# Patient Record
Sex: FEMALE | Race: WHITE | Employment: OTHER | URBAN - METROPOLITAN AREA
[De-identification: names, ages, dates, MRNs, and addresses within clinical notes are randomized per-mention and may not be internally consistent; named-entity substitution may affect disease eponyms.]

---

## 2018-01-19 RX ORDER — ACETAMINOPHEN 500 MG
500 TABLET ORAL EVERY 4 HOURS PRN
Status: ON HOLD | COMMUNITY
End: 2019-09-08 | Stop reason: HOSPADM

## 2018-01-19 RX ORDER — GABAPENTIN 100 MG/1
100 CAPSULE ORAL 2 TIMES DAILY
Status: ON HOLD | COMMUNITY
End: 2019-09-05

## 2018-01-19 RX ORDER — LATANOPROST 50 UG/ML
1 SOLUTION/ DROPS OPHTHALMIC DAILY
Status: ON HOLD | COMMUNITY
End: 2021-09-09 | Stop reason: HOSPADM

## 2018-01-19 RX ORDER — DORZOLAMIDE HCL 20 MG/ML
1 SOLUTION/ DROPS OPHTHALMIC 2 TIMES DAILY
Status: ON HOLD | COMMUNITY
End: 2021-09-06

## 2018-01-19 RX ORDER — ATORVASTATIN CALCIUM 10 MG/1
10 TABLET, FILM COATED ORAL DAILY
COMMUNITY

## 2018-01-19 RX ORDER — CHOLECALCIFEROL (VITAMIN D3) 125 MCG
500 CAPSULE ORAL DAILY
Status: ON HOLD | COMMUNITY
End: 2019-09-05

## 2018-01-19 RX ORDER — HYDRALAZINE HYDROCHLORIDE 25 MG/1
25 TABLET, FILM COATED ORAL 2 TIMES DAILY
Status: ON HOLD | COMMUNITY
End: 2019-09-05

## 2018-01-19 RX ORDER — LABETALOL 100 MG/1
200 TABLET, FILM COATED ORAL 2 TIMES DAILY
COMMUNITY

## 2018-01-19 RX ORDER — CYCLOBENZAPRINE HCL 5 MG
5 TABLET ORAL EVERY 8 HOURS
Status: ON HOLD | COMMUNITY
End: 2019-09-05

## 2018-01-19 RX ORDER — TRAMADOL HYDROCHLORIDE 50 MG/1
50 TABLET ORAL EVERY 6 HOURS PRN
Status: ON HOLD | COMMUNITY
End: 2019-09-05

## 2018-01-19 RX ORDER — AMOXICILLIN 250 MG
1 CAPSULE ORAL DAILY
Status: ON HOLD | COMMUNITY
End: 2019-09-05

## 2018-01-19 RX ORDER — ASPIRIN 325 MG
325 TABLET ORAL DAILY
Status: ON HOLD | COMMUNITY
End: 2019-09-08 | Stop reason: HOSPADM

## 2018-01-19 RX ORDER — OMEGA-3S/DHA/EPA/FISH OIL/D3 300MG-1000
400 CAPSULE ORAL DAILY
Status: ON HOLD | COMMUNITY
End: 2019-09-05

## 2018-01-19 RX ORDER — LEVOTHYROXINE SODIUM 0.1 MG/1
100 TABLET ORAL DAILY
COMMUNITY

## 2019-03-19 ENCOUNTER — OFFICE VISIT (OUTPATIENT)
Dept: GERIATRIC MEDICINE | Age: 84
End: 2019-03-19
Payer: MEDICARE

## 2019-03-19 DIAGNOSIS — E03.9 HYPOTHYROIDISM, UNSPECIFIED TYPE: ICD-10-CM

## 2019-03-19 DIAGNOSIS — M15.9 OSTEOARTHRITIS OF MULTIPLE JOINTS, UNSPECIFIED OSTEOARTHRITIS TYPE: ICD-10-CM

## 2019-03-19 DIAGNOSIS — I10 ESSENTIAL HYPERTENSION: Primary | ICD-10-CM

## 2019-03-19 DIAGNOSIS — H40.40X0 GLAUCOMA SECONDARY TO EYE INFLAMMATION, UNSPECIFIED GLAUCOMA STAGE, UNSPECIFIED LATERALITY: ICD-10-CM

## 2019-03-19 DIAGNOSIS — R63.4 WEIGHT LOSS: ICD-10-CM

## 2019-03-19 PROCEDURE — 1090F PRES/ABSN URINE INCON ASSESS: CPT | Performed by: INTERNAL MEDICINE

## 2019-03-19 PROCEDURE — 4040F PNEUMOC VAC/ADMIN/RCVD: CPT | Performed by: INTERNAL MEDICINE

## 2019-03-19 PROCEDURE — G8421 BMI NOT CALCULATED: HCPCS | Performed by: INTERNAL MEDICINE

## 2019-03-19 PROCEDURE — 99215 OFFICE O/P EST HI 40 MIN: CPT | Performed by: INTERNAL MEDICINE

## 2019-03-19 PROCEDURE — G8484 FLU IMMUNIZE NO ADMIN: HCPCS | Performed by: INTERNAL MEDICINE

## 2019-03-19 PROCEDURE — 4004F PT TOBACCO SCREEN RCVD TLK: CPT | Performed by: INTERNAL MEDICINE

## 2019-03-19 PROCEDURE — G8428 CUR MEDS NOT DOCUMENT: HCPCS | Performed by: INTERNAL MEDICINE

## 2019-03-19 PROCEDURE — 1123F ACP DISCUSS/DSCN MKR DOCD: CPT | Performed by: INTERNAL MEDICINE

## 2019-04-05 NOTE — PROGRESS NOTES
PATIENT: Daniela Scott : 1928 DOS: 2019     Juan Miguel Dave    Seen for a followup visit for this 24-year-old woman, who was seen in the office with her  present. The patient was recently seen with the . The patient has had a recent evaluation of medication. The patient has discontinued her eye drops. She does have followup pending with ophthalmology. The patient has not had any new chest pain or palpitations. She has had recent weight loss, poor oral intake. The patient has not had any new chest pain or palpitations. She remains globally at her baseline. The patient has not had any new emesis. Her mentation is close to baseline. Her last MoCA was in September, it was . OBJECTIVE:  Her vital signs were stable. She was afebrile. Pupils are equal and reactive. Extraocular movements are intact. Oral mucosa moist. No thrush. Chest showed diminished breath sounds. No crackles, no wheezing. Cardiovascular exam showed regular rate. No appreciable murmurs, rubs or gallops. ABDOMEN:  Soft, nontender. Extremities showed +1 dorsal pedal pulse. ASSESSMENT AND PLAN:  1. Hypertension. Blood pressure is stable. Continue with home regimen. 2.   Hypothyroidism. The patient is on Synthroid. Last TSH was at target in December. We will maintain the current regimen. 3.   degenerative joint disease. Continue on anti-inflammatories. 4.   Weight loss. We will start on low-dose Remeron and monitor clinically. 5.   Glaucoma. The patient is following up with ophthalmology. I did instruct her to discuss with ophthalmologist for discontinuation of medications. We will follow up as needed.         Electronically Signed By: Adarsh Wilkins M.D. on 2019 22:35:15  ______________________________  Adarsh Wilkins M.D.  [id number]  D: 2019 15:59:29  T: 2019 14:38:32    cc: Prudencio Quiñones at [facility]

## 2019-09-05 ENCOUNTER — APPOINTMENT (OUTPATIENT)
Dept: GENERAL RADIOLOGY | Age: 84
DRG: 480 | End: 2019-09-05
Payer: MEDICARE

## 2019-09-05 ENCOUNTER — HOSPITAL ENCOUNTER (INPATIENT)
Age: 84
LOS: 3 days | Discharge: NURSING, REHAB OR LONG-TERM CARE | DRG: 480 | End: 2019-09-08
Attending: EMERGENCY MEDICINE | Admitting: ORTHOPAEDIC SURGERY
Payer: MEDICARE

## 2019-09-05 ENCOUNTER — ANESTHESIA (OUTPATIENT)
Dept: OPERATING ROOM | Age: 84
DRG: 480 | End: 2019-09-05
Payer: MEDICARE

## 2019-09-05 ENCOUNTER — ANESTHESIA EVENT (OUTPATIENT)
Dept: OPERATING ROOM | Age: 84
DRG: 480 | End: 2019-09-05
Payer: MEDICARE

## 2019-09-05 VITALS — SYSTOLIC BLOOD PRESSURE: 85 MMHG | DIASTOLIC BLOOD PRESSURE: 42 MMHG | OXYGEN SATURATION: 100 % | TEMPERATURE: 93.2 F

## 2019-09-05 DIAGNOSIS — S72.001A HIP FRACTURE REQUIRING OPERATIVE REPAIR, RIGHT, CLOSED, INITIAL ENCOUNTER (HCC): ICD-10-CM

## 2019-09-05 DIAGNOSIS — S72.001A CLOSED FRACTURE OF RIGHT HIP REQUIRING OPERATIVE REPAIR, INITIAL ENCOUNTER (HCC): Primary | ICD-10-CM

## 2019-09-05 LAB
ALBUMIN SERPL-MCNC: 3.4 G/DL (ref 3.5–4.6)
ALP BLD-CCNC: 59 U/L (ref 40–130)
ALT SERPL-CCNC: 12 U/L (ref 0–33)
ANION GAP SERPL CALCULATED.3IONS-SCNC: 10 MEQ/L (ref 9–15)
APTT: 30.6 SEC (ref 24.4–36.8)
AST SERPL-CCNC: 18 U/L (ref 0–35)
BASOPHILS ABSOLUTE: 0 K/UL (ref 0–0.2)
BASOPHILS RELATIVE PERCENT: 0.7 %
BILIRUB SERPL-MCNC: 0.3 MG/DL (ref 0.2–0.7)
BUN BLDV-MCNC: 10 MG/DL (ref 8–23)
CALCIUM SERPL-MCNC: 8.6 MG/DL (ref 8.5–9.9)
CHLORIDE BLD-SCNC: 99 MEQ/L (ref 95–107)
CO2: 28 MEQ/L (ref 20–31)
CREAT SERPL-MCNC: 0.51 MG/DL (ref 0.5–0.9)
EOSINOPHILS ABSOLUTE: 0.1 K/UL (ref 0–0.7)
EOSINOPHILS RELATIVE PERCENT: 2.8 %
GFR AFRICAN AMERICAN: >60
GFR NON-AFRICAN AMERICAN: >60
GLOBULIN: 2.9 G/DL (ref 2.3–3.5)
GLUCOSE BLD-MCNC: 109 MG/DL (ref 70–99)
HCT VFR BLD CALC: 35.3 % (ref 37–47)
HEMOGLOBIN: 11.6 G/DL (ref 12–16)
INR BLD: 1
LV EF: 55 %
LVEF MODALITY: NORMAL
LYMPHOCYTES ABSOLUTE: 0.7 K/UL (ref 1–4.8)
LYMPHOCYTES RELATIVE PERCENT: 15.7 %
MCH RBC QN AUTO: 31.9 PG (ref 27–31.3)
MCHC RBC AUTO-ENTMCNC: 32.9 % (ref 33–37)
MCV RBC AUTO: 97.1 FL (ref 82–100)
MONOCYTES ABSOLUTE: 0.6 K/UL (ref 0.2–0.8)
MONOCYTES RELATIVE PERCENT: 13.8 %
NEUTROPHILS ABSOLUTE: 3 K/UL (ref 1.4–6.5)
NEUTROPHILS RELATIVE PERCENT: 67 %
PDW BLD-RTO: 13.8 % (ref 11.5–14.5)
PLATELET # BLD: 217 K/UL (ref 130–400)
POTASSIUM SERPL-SCNC: 4.5 MEQ/L (ref 3.4–4.9)
PROTHROMBIN TIME: 13.8 SEC (ref 12.3–14.9)
RBC # BLD: 3.64 M/UL (ref 4.2–5.4)
SODIUM BLD-SCNC: 137 MEQ/L (ref 135–144)
TOTAL PROTEIN: 6.3 G/DL (ref 6.3–8)
WBC # BLD: 4.5 K/UL (ref 4.8–10.8)

## 2019-09-05 PROCEDURE — 6360000002 HC RX W HCPCS: Performed by: NURSE PRACTITIONER

## 2019-09-05 PROCEDURE — 1210000000 HC MED SURG R&B

## 2019-09-05 PROCEDURE — 99285 EMERGENCY DEPT VISIT HI MDM: CPT

## 2019-09-05 PROCEDURE — 2709999900 HC NON-CHARGEABLE SUPPLY: Performed by: ORTHOPAEDIC SURGERY

## 2019-09-05 PROCEDURE — 3600000004 HC SURGERY LEVEL 4 BASE: Performed by: ORTHOPAEDIC SURGERY

## 2019-09-05 PROCEDURE — 7100000000 HC PACU RECOVERY - FIRST 15 MIN: Performed by: ORTHOPAEDIC SURGERY

## 2019-09-05 PROCEDURE — 2580000003 HC RX 258: Performed by: ANESTHESIOLOGY

## 2019-09-05 PROCEDURE — 3700000001 HC ADD 15 MINUTES (ANESTHESIA): Performed by: ORTHOPAEDIC SURGERY

## 2019-09-05 PROCEDURE — 2720000010 HC SURG SUPPLY STERILE: Performed by: ORTHOPAEDIC SURGERY

## 2019-09-05 PROCEDURE — 96375 TX/PRO/DX INJ NEW DRUG ADDON: CPT

## 2019-09-05 PROCEDURE — 6360000002 HC RX W HCPCS: Performed by: ANESTHESIOLOGY

## 2019-09-05 PROCEDURE — 0QSB04Z REPOSITION RIGHT LOWER FEMUR WITH INTERNAL FIXATION DEVICE, OPEN APPROACH: ICD-10-PCS | Performed by: ORTHOPAEDIC SURGERY

## 2019-09-05 PROCEDURE — 3700000000 HC ANESTHESIA ATTENDED CARE: Performed by: ORTHOPAEDIC SURGERY

## 2019-09-05 PROCEDURE — 73502 X-RAY EXAM HIP UNI 2-3 VIEWS: CPT

## 2019-09-05 PROCEDURE — 6830039000 HC L3 TRAUMA ALERT

## 2019-09-05 PROCEDURE — 76942 ECHO GUIDE FOR BIOPSY: CPT | Performed by: ANESTHESIOLOGY

## 2019-09-05 PROCEDURE — 96374 THER/PROPH/DIAG INJ IV PUSH: CPT

## 2019-09-05 PROCEDURE — 3209999900 FLUORO FOR SURGICAL PROCEDURES

## 2019-09-05 PROCEDURE — 6370000000 HC RX 637 (ALT 250 FOR IP): Performed by: NURSE PRACTITIONER

## 2019-09-05 PROCEDURE — 93005 ELECTROCARDIOGRAM TRACING: CPT | Performed by: NURSE PRACTITIONER

## 2019-09-05 PROCEDURE — 3600000014 HC SURGERY LEVEL 4 ADDTL 15MIN: Performed by: ORTHOPAEDIC SURGERY

## 2019-09-05 PROCEDURE — 7100000001 HC PACU RECOVERY - ADDTL 15 MIN: Performed by: ORTHOPAEDIC SURGERY

## 2019-09-05 PROCEDURE — 71045 X-RAY EXAM CHEST 1 VIEW: CPT

## 2019-09-05 PROCEDURE — 93306 TTE W/DOPPLER COMPLETE: CPT

## 2019-09-05 PROCEDURE — 2580000003 HC RX 258: Performed by: NURSE PRACTITIONER

## 2019-09-05 PROCEDURE — 2500000003 HC RX 250 WO HCPCS: Performed by: ANESTHESIOLOGY

## 2019-09-05 PROCEDURE — 73552 X-RAY EXAM OF FEMUR 2/>: CPT

## 2019-09-05 PROCEDURE — 85610 PROTHROMBIN TIME: CPT

## 2019-09-05 PROCEDURE — 2580000003 HC RX 258: Performed by: ORTHOPAEDIC SURGERY

## 2019-09-05 PROCEDURE — 85025 COMPLETE CBC W/AUTO DIFF WBC: CPT

## 2019-09-05 PROCEDURE — 80053 COMPREHEN METABOLIC PANEL: CPT

## 2019-09-05 PROCEDURE — C1713 ANCHOR/SCREW BN/BN,TIS/BN: HCPCS | Performed by: ORTHOPAEDIC SURGERY

## 2019-09-05 PROCEDURE — 6360000002 HC RX W HCPCS: Performed by: EMERGENCY MEDICINE

## 2019-09-05 PROCEDURE — 36415 COLL VENOUS BLD VENIPUNCTURE: CPT

## 2019-09-05 PROCEDURE — 6370000000 HC RX 637 (ALT 250 FOR IP): Performed by: INTERNAL MEDICINE

## 2019-09-05 PROCEDURE — 85730 THROMBOPLASTIN TIME PARTIAL: CPT

## 2019-09-05 PROCEDURE — C1769 GUIDE WIRE: HCPCS | Performed by: ORTHOPAEDIC SURGERY

## 2019-09-05 PROCEDURE — 99222 1ST HOSP IP/OBS MODERATE 55: CPT | Performed by: INTERNAL MEDICINE

## 2019-09-05 PROCEDURE — 2780000010 HC IMPLANT OTHER: Performed by: ORTHOPAEDIC SURGERY

## 2019-09-05 DEVICE — IMPLANTABLE DEVICE: Type: IMPLANTABLE DEVICE | Site: FEMUR | Status: FUNCTIONAL

## 2019-09-05 DEVICE — SCREW BNE L38MM DIA5MM TIB LT GRN TI ST CANN LOK FULL THRD: Type: IMPLANTABLE DEVICE | Site: FEMUR | Status: FUNCTIONAL

## 2019-09-05 DEVICE — NAIL IM L235MM DIA10MM 130DEG SHT R PROX FEM GRN TI CANN: Type: IMPLANTABLE DEVICE | Site: FEMUR | Status: FUNCTIONAL

## 2019-09-05 RX ORDER — ONDANSETRON 2 MG/ML
4 INJECTION INTRAMUSCULAR; INTRAVENOUS ONCE
Status: COMPLETED | OUTPATIENT
Start: 2019-09-05 | End: 2019-09-05

## 2019-09-05 RX ORDER — ACETAMINOPHEN 80 MG
TABLET,CHEWABLE ORAL ONCE
Status: DISCONTINUED | OUTPATIENT
Start: 2019-09-05 | End: 2019-09-08 | Stop reason: HOSPADM

## 2019-09-05 RX ORDER — SODIUM CHLORIDE 0.9 % (FLUSH) 0.9 %
10 SYRINGE (ML) INJECTION EVERY 12 HOURS SCHEDULED
Status: DISCONTINUED | OUTPATIENT
Start: 2019-09-05 | End: 2019-09-08 | Stop reason: HOSPADM

## 2019-09-05 RX ORDER — SODIUM CHLORIDE 0.9 % (FLUSH) 0.9 %
10 SYRINGE (ML) INJECTION EVERY 12 HOURS SCHEDULED
Status: DISCONTINUED | OUTPATIENT
Start: 2019-09-05 | End: 2019-09-05 | Stop reason: HOSPADM

## 2019-09-05 RX ORDER — SODIUM CHLORIDE, SODIUM LACTATE, POTASSIUM CHLORIDE, CALCIUM CHLORIDE 600; 310; 30; 20 MG/100ML; MG/100ML; MG/100ML; MG/100ML
INJECTION, SOLUTION INTRAVENOUS CONTINUOUS
Status: DISCONTINUED | OUTPATIENT
Start: 2019-09-05 | End: 2019-09-05

## 2019-09-05 RX ORDER — ASPIRIN 81 MG/1
81 TABLET ORAL DAILY
Status: DISCONTINUED | OUTPATIENT
Start: 2019-09-05 | End: 2019-09-08 | Stop reason: HOSPADM

## 2019-09-05 RX ORDER — CHOLECALCIFEROL (VITAMIN D3) 125 MCG
500 CAPSULE ORAL DAILY
Status: DISCONTINUED | OUTPATIENT
Start: 2019-09-05 | End: 2019-09-08 | Stop reason: HOSPADM

## 2019-09-05 RX ORDER — POLYETHYLENE GLYCOL 3350 17 G/17G
12 POWDER, FOR SOLUTION ORAL DAILY
Status: ON HOLD | COMMUNITY
End: 2019-09-05

## 2019-09-05 RX ORDER — ONDANSETRON 2 MG/ML
4 INJECTION INTRAMUSCULAR; INTRAVENOUS
Status: DISCONTINUED | OUTPATIENT
Start: 2019-09-05 | End: 2019-09-05 | Stop reason: HOSPADM

## 2019-09-05 RX ORDER — POLYETHYLENE GLYCOL 3350 17 G/17G
12 POWDER, FOR SOLUTION ORAL DAILY
Status: DISCONTINUED | OUTPATIENT
Start: 2019-09-05 | End: 2019-09-08 | Stop reason: HOSPADM

## 2019-09-05 RX ORDER — METOCLOPRAMIDE HYDROCHLORIDE 5 MG/ML
10 INJECTION INTRAMUSCULAR; INTRAVENOUS
Status: DISCONTINUED | OUTPATIENT
Start: 2019-09-05 | End: 2019-09-05 | Stop reason: HOSPADM

## 2019-09-05 RX ORDER — ROPIVACAINE HYDROCHLORIDE 5 MG/ML
INJECTION, SOLUTION EPIDURAL; INFILTRATION; PERINEURAL PRN
Status: DISCONTINUED | OUTPATIENT
Start: 2019-09-05 | End: 2019-09-05 | Stop reason: SDUPTHER

## 2019-09-05 RX ORDER — MEPERIDINE HYDROCHLORIDE 25 MG/ML
12.5 INJECTION INTRAMUSCULAR; INTRAVENOUS; SUBCUTANEOUS EVERY 5 MIN PRN
Status: DISCONTINUED | OUTPATIENT
Start: 2019-09-05 | End: 2019-09-05 | Stop reason: HOSPADM

## 2019-09-05 RX ORDER — MAGNESIUM HYDROXIDE 1200 MG/15ML
LIQUID ORAL CONTINUOUS PRN
Status: COMPLETED | OUTPATIENT
Start: 2019-09-05 | End: 2019-09-05

## 2019-09-05 RX ORDER — LEVOTHYROXINE SODIUM 0.1 MG/1
100 TABLET ORAL DAILY
Status: DISCONTINUED | OUTPATIENT
Start: 2019-09-05 | End: 2019-09-08 | Stop reason: HOSPADM

## 2019-09-05 RX ORDER — SODIUM CHLORIDE 0.9 % (FLUSH) 0.9 %
10 SYRINGE (ML) INJECTION EVERY 12 HOURS SCHEDULED
Status: DISCONTINUED | OUTPATIENT
Start: 2019-09-05 | End: 2019-09-05

## 2019-09-05 RX ORDER — SODIUM CHLORIDE, SODIUM LACTATE, POTASSIUM CHLORIDE, CALCIUM CHLORIDE 600; 310; 30; 20 MG/100ML; MG/100ML; MG/100ML; MG/100ML
INJECTION, SOLUTION INTRAVENOUS CONTINUOUS PRN
Status: DISCONTINUED | OUTPATIENT
Start: 2019-09-05 | End: 2019-09-05 | Stop reason: SDUPTHER

## 2019-09-05 RX ORDER — MORPHINE SULFATE 2 MG/ML
2 INJECTION, SOLUTION INTRAMUSCULAR; INTRAVENOUS
Status: DISCONTINUED | OUTPATIENT
Start: 2019-09-05 | End: 2019-09-08 | Stop reason: HOSPADM

## 2019-09-05 RX ORDER — DEXAMETHASONE SODIUM PHOSPHATE 10 MG/ML
8 INJECTION INTRAMUSCULAR; INTRAVENOUS ONCE
Status: DISCONTINUED | OUTPATIENT
Start: 2019-09-05 | End: 2019-09-05

## 2019-09-05 RX ORDER — LEVOTHYROXINE SODIUM 0.1 MG/1
100 TABLET ORAL DAILY
Status: ON HOLD | COMMUNITY
Start: 2014-10-24 | End: 2019-09-05

## 2019-09-05 RX ORDER — LIDOCAINE HYDROCHLORIDE AND EPINEPHRINE 20; 5 MG/ML; UG/ML
INJECTION, SOLUTION EPIDURAL; INFILTRATION; INTRACAUDAL; PERINEURAL PRN
Status: DISCONTINUED | OUTPATIENT
Start: 2019-09-05 | End: 2019-09-05 | Stop reason: SDUPTHER

## 2019-09-05 RX ORDER — MORPHINE SULFATE 2 MG/ML
4 INJECTION, SOLUTION INTRAMUSCULAR; INTRAVENOUS ONCE
Status: COMPLETED | OUTPATIENT
Start: 2019-09-05 | End: 2019-09-05

## 2019-09-05 RX ORDER — ONDANSETRON 2 MG/ML
4 INJECTION INTRAMUSCULAR; INTRAVENOUS EVERY 6 HOURS PRN
Status: DISCONTINUED | OUTPATIENT
Start: 2019-09-05 | End: 2019-09-08 | Stop reason: HOSPADM

## 2019-09-05 RX ORDER — ASPIRIN 325 MG
325 TABLET ORAL DAILY
Status: DISCONTINUED | OUTPATIENT
Start: 2019-09-05 | End: 2019-09-05

## 2019-09-05 RX ORDER — SODIUM CHLORIDE 0.9 % (FLUSH) 0.9 %
10 SYRINGE (ML) INJECTION PRN
Status: DISCONTINUED | OUTPATIENT
Start: 2019-09-05 | End: 2019-09-05 | Stop reason: HOSPADM

## 2019-09-05 RX ORDER — DIPHENHYDRAMINE HYDROCHLORIDE 50 MG/ML
12.5 INJECTION INTRAMUSCULAR; INTRAVENOUS
Status: DISCONTINUED | OUTPATIENT
Start: 2019-09-05 | End: 2019-09-05 | Stop reason: HOSPADM

## 2019-09-05 RX ORDER — OXYCODONE HYDROCHLORIDE 5 MG/1
2.5 TABLET ORAL EVERY 4 HOURS PRN
Status: DISCONTINUED | OUTPATIENT
Start: 2019-09-05 | End: 2019-09-08 | Stop reason: HOSPADM

## 2019-09-05 RX ORDER — FENTANYL CITRATE 50 UG/ML
50 INJECTION, SOLUTION INTRAMUSCULAR; INTRAVENOUS EVERY 10 MIN PRN
Status: DISCONTINUED | OUTPATIENT
Start: 2019-09-05 | End: 2019-09-05 | Stop reason: HOSPADM

## 2019-09-05 RX ORDER — LATANOPROST 50 UG/ML
1 SOLUTION/ DROPS OPHTHALMIC 2 TIMES DAILY
Status: ON HOLD | COMMUNITY
Start: 2018-11-15 | End: 2019-09-05

## 2019-09-05 RX ORDER — OXYCODONE HYDROCHLORIDE 5 MG/1
5 TABLET ORAL EVERY 4 HOURS PRN
Status: DISCONTINUED | OUTPATIENT
Start: 2019-09-05 | End: 2019-09-08 | Stop reason: HOSPADM

## 2019-09-05 RX ORDER — SODIUM CHLORIDE 9 MG/ML
INJECTION, SOLUTION INTRAVENOUS CONTINUOUS
Status: DISCONTINUED | OUTPATIENT
Start: 2019-09-05 | End: 2019-09-07

## 2019-09-05 RX ORDER — POLYETHYLENE GLYCOL 3350 17 G/17G
12 POWDER, FOR SOLUTION ORAL DAILY
COMMUNITY

## 2019-09-05 RX ORDER — PROPOFOL 10 MG/ML
INJECTION, EMULSION INTRAVENOUS PRN
Status: DISCONTINUED | OUTPATIENT
Start: 2019-09-05 | End: 2019-09-05 | Stop reason: SDUPTHER

## 2019-09-05 RX ORDER — PROPOFOL 10 MG/ML
INJECTION, EMULSION INTRAVENOUS CONTINUOUS PRN
Status: DISCONTINUED | OUTPATIENT
Start: 2019-09-05 | End: 2019-09-05 | Stop reason: SDUPTHER

## 2019-09-05 RX ORDER — SODIUM CHLORIDE 0.9 % (FLUSH) 0.9 %
10 SYRINGE (ML) INJECTION PRN
Status: DISCONTINUED | OUTPATIENT
Start: 2019-09-05 | End: 2019-09-05

## 2019-09-05 RX ORDER — LATANOPROST 50 UG/ML
1 SOLUTION/ DROPS OPHTHALMIC DAILY
Status: DISCONTINUED | OUTPATIENT
Start: 2019-09-05 | End: 2019-09-08 | Stop reason: HOSPADM

## 2019-09-05 RX ORDER — LABETALOL 100 MG/1
200 TABLET, FILM COATED ORAL 2 TIMES DAILY
Status: DISCONTINUED | OUTPATIENT
Start: 2019-09-05 | End: 2019-09-06

## 2019-09-05 RX ORDER — DORZOLAMIDE HCL 20 MG/ML
1 SOLUTION/ DROPS OPHTHALMIC 2 TIMES DAILY
Status: DISCONTINUED | OUTPATIENT
Start: 2019-09-05 | End: 2019-09-08 | Stop reason: HOSPADM

## 2019-09-05 RX ORDER — ATORVASTATIN CALCIUM 10 MG/1
10 TABLET, FILM COATED ORAL DAILY
Status: DISCONTINUED | OUTPATIENT
Start: 2019-09-05 | End: 2019-09-08 | Stop reason: HOSPADM

## 2019-09-05 RX ORDER — SODIUM CHLORIDE 0.9 % (FLUSH) 0.9 %
10 SYRINGE (ML) INJECTION PRN
Status: DISCONTINUED | OUTPATIENT
Start: 2019-09-05 | End: 2019-09-08 | Stop reason: HOSPADM

## 2019-09-05 RX ADMIN — SODIUM CHLORIDE, POTASSIUM CHLORIDE, SODIUM LACTATE AND CALCIUM CHLORIDE: 600; 310; 30; 20 INJECTION, SOLUTION INTRAVENOUS at 18:40

## 2019-09-05 RX ADMIN — CEFAZOLIN SODIUM 2 G: 1 INJECTION, SOLUTION INTRAVENOUS at 18:48

## 2019-09-05 RX ADMIN — PROPOFOL 75 MCG/KG/MIN: 10 INJECTION, EMULSION INTRAVENOUS at 18:48

## 2019-09-05 RX ADMIN — Medication 0.1 MG: at 20:17

## 2019-09-05 RX ADMIN — LATANOPROST 1 DROP: 50 SOLUTION OPHTHALMIC at 10:36

## 2019-09-05 RX ADMIN — ROPIVACAINE HYDROCHLORIDE 20 ML: 5 INJECTION, SOLUTION EPIDURAL; INFILTRATION; PERINEURAL at 15:18

## 2019-09-05 RX ADMIN — LIDOCAINE HYDROCHLORIDE,EPINEPHRINE BITARTRATE 5 ML: 20; .005 INJECTION, SOLUTION EPIDURAL; INFILTRATION; INTRACAUDAL; PERINEURAL at 15:18

## 2019-09-05 RX ADMIN — PROPOFOL 20 MG: 10 INJECTION, EMULSION INTRAVENOUS at 19:40

## 2019-09-05 RX ADMIN — OXYCODONE HYDROCHLORIDE 5 MG: 5 TABLET ORAL at 22:17

## 2019-09-05 RX ADMIN — Medication 0.1 MG: at 18:55

## 2019-09-05 RX ADMIN — Medication 0.1 MG: at 19:01

## 2019-09-05 RX ADMIN — SODIUM CHLORIDE: 9 INJECTION, SOLUTION INTRAVENOUS at 22:36

## 2019-09-05 RX ADMIN — PROPOFOL 40 MG: 10 INJECTION, EMULSION INTRAVENOUS at 18:44

## 2019-09-05 RX ADMIN — Medication 0.1 MG: at 20:15

## 2019-09-05 RX ADMIN — DORZOLAMIDE HYDROCHLORIDE 1 DROP: 20 SOLUTION/ DROPS OPHTHALMIC at 22:17

## 2019-09-05 RX ADMIN — Medication 0.1 MG: at 18:51

## 2019-09-05 RX ADMIN — ONDANSETRON 4 MG: 2 INJECTION INTRAMUSCULAR; INTRAVENOUS at 01:09

## 2019-09-05 RX ADMIN — MORPHINE SULFATE 4 MG: 2 INJECTION, SOLUTION INTRAMUSCULAR; INTRAVENOUS at 01:09

## 2019-09-05 RX ADMIN — SODIUM CHLORIDE, POTASSIUM CHLORIDE, SODIUM LACTATE AND CALCIUM CHLORIDE: 600; 310; 30; 20 INJECTION, SOLUTION INTRAVENOUS at 19:33

## 2019-09-05 RX ADMIN — Medication 0.1 MG: at 20:12

## 2019-09-05 RX ADMIN — HYDROMORPHONE HYDROCHLORIDE 0.5 MG: 1 INJECTION, SOLUTION INTRAMUSCULAR; INTRAVENOUS; SUBCUTANEOUS at 02:59

## 2019-09-05 ASSESSMENT — PULMONARY FUNCTION TESTS
PIF_VALUE: 0
PIF_VALUE: 1
PIF_VALUE: 0

## 2019-09-05 ASSESSMENT — ENCOUNTER SYMPTOMS
VOMITING: 0
EYE DISCHARGE: 0
COLOR CHANGE: 0
WHEEZING: 0
ABDOMINAL PAIN: 0
COUGH: 0
PHOTOPHOBIA: 0
SHORTNESS OF BREATH: 0
APNEA: 0
BACK PAIN: 0
CHOKING: 0
SORE THROAT: 0
ABDOMINAL DISTENTION: 0
CHEST TIGHTNESS: 0
NAUSEA: 0

## 2019-09-05 ASSESSMENT — PAIN SCALES - GENERAL
PAINLEVEL_OUTOF10: 9
PAINLEVEL_OUTOF10: 7
PAINLEVEL_OUTOF10: 7
PAINLEVEL_OUTOF10: 6
PAINLEVEL_OUTOF10: 4
PAINLEVEL_OUTOF10: 10

## 2019-09-05 ASSESSMENT — PAIN DESCRIPTION - PROGRESSION: CLINICAL_PROGRESSION: GRADUALLY IMPROVING

## 2019-09-05 ASSESSMENT — PAIN DESCRIPTION - LOCATION: LOCATION: HIP

## 2019-09-05 ASSESSMENT — PAIN DESCRIPTION - ORIENTATION: ORIENTATION: RIGHT

## 2019-09-05 ASSESSMENT — PAIN DESCRIPTION - PAIN TYPE: TYPE: ACUTE PAIN

## 2019-09-05 NOTE — CONSULTS
Hospital Medicine  Consult    Patient:  Anabel Bailon  MRN: [id number]    CHIEF COMPLAINT:    Chief Complaint   Patient presents with   [facility] Fall    Hip Pain       History Obtained From:  Patient, EMR  Primary Care Physician: Kira Sharif MD    HISTORY OF PRESENT ILLNESS:   The patient is a 80 y.o. 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 Past Medical History:  CVA in 2016, HTN, hypothyroidism, severe glaucoma of the left eye, PUD, MCTD     Past Surgical History:  Partial thyroidectomy  Appendectomy  Hysterectomy left hip fracture repair    Medications Prior to Admission:    Prior to Admission medications    Medication Sig Start Date End Date Taking?  Authorizing Provider   polyethylene glycol (GLYCOLAX) packet Take 12 g by mouth daily    Yes Historical Provider, MD   levothyroxine (SYNTHROID) 100 MCG tablet Take 100 mcg by mouth daily 10/24/14  Yes Historical Provider, MD   latanoprost (XALATAN) 0.005 % ophthalmic solution Place 1 drop into the left eye 2 times daily 11/15/18  Yes Historical Provider, MD   labetalol (NORMODYNE) 100 MG tablet Take 200 mg by mouth 2 times daily    Yes Historical Provider, MD   atorvastatin (LIPITOR) 10 MG tablet Take 10 mg by mouth daily   Yes Historical Provider, MD   levothyroxine (SYNTHROID) 100 MCG tablet Take 100 mcg by mouth Daily   Yes Historical Provider, MD   MULTIPLE MINERALS-VITAMINS PO Take 1 tablet by mouth daily   Yes Historical Provider, MD   latanoprost (XALATAN) 0.005 % ophthalmic solution Place 1 drop into the left eye daily   Yes

## 2019-09-05 NOTE — ANESTHESIA PRE PROCEDURE
Past Medical History:  History reviewed. No pertinent past medical history. Past Surgical History:  History reviewed. No pertinent surgical history. Social History:    Social History     Tobacco Use    Smoking status: Never Smoker    Smokeless tobacco: Never Used   Substance Use Topics    Alcohol use: Not on file                                Counseling given: Not Answered      Vital Signs (Current):   Vitals:    09/05/19 0300 09/05/19 0407 09/05/19 0730 09/05/19 1430   BP: (!) 160/59 (!) 134/56 (!) 110/51 (!) 100/52   Pulse: 62 68 81 102   Resp: 18 16 18 16   Temp:  97.3 °F (36.3 °C) 97.3 °F (36.3 °C) 97.9 °F (36.6 °C)   TempSrc:  Oral Oral Temporal   SpO2: 97% 95% 97% 93%   Weight:  117 lb (53.1 kg)     Height:  4' 11\" (1.499 m)                                                BP Readings from Last 3 Encounters:   09/05/19 (!) 100/52       NPO Status: Time of last liquid consumption: 2030                        Time of last solid consumption: 1800                        Date of last liquid consumption: 09/04/19                        Date of last solid food consumption: 09/04/19    BMI:   Wt Readings from Last 3 Encounters:   09/05/19 117 lb (53.1 kg)   02/09/16 129 lb 6.6 oz (58.7 kg)     Body mass index is 23.63 kg/m².     CBC:   Lab Results   Component Value Date    WBC 4.5 09/05/2019    RBC 3.64 09/05/2019    RBC 3.85 01/19/2012    HGB 11.6 09/05/2019    HCT 35.3 09/05/2019    MCV 97.1 09/05/2019    RDW 13.8 09/05/2019     09/05/2019       CMP:   Lab Results   Component Value Date     09/05/2019    K 4.5 09/05/2019    CL 99 09/05/2019    CO2 28 09/05/2019    BUN 10 09/05/2019    CREATININE 0.51 09/05/2019    GFRAA >60.0 09/05/2019    LABGLOM >60.0 09/05/2019    GLUCOSE 109 09/05/2019    GLUCOSE 97 05/11/2012    PROT 6.3 09/05/2019    CALCIUM 8.6 09/05/2019    BILITOT 0.3 09/05/2019    ALKPHOS 59 09/05/2019    AST 18 09/05/2019    ALT 12 09/05/2019       POC Tests: No results for input(s): POCGLU, POCNA, POCK, POCCL, POCBUN, POCHEMO, POCHCT in the last 72 hours. Coags:   Lab Results   Component Value Date    PROTIME 13.8 09/05/2019    INR 1.0 09/05/2019    APTT 30.6 09/05/2019       HCG (If Applicable): No results found for: PREGTESTUR, PREGSERUM, HCG, HCGQUANT     ABGs: No results found for: PHART, PO2ART, HLY9UFO, FTY7AKI, BEART, C5QGJRQC     Type & Screen (If Applicable):  No results found for: LABABO, LABRH    Anesthesia Evaluation     history of anesthetic complications: difficult airway. Airway: Mallampati: II  TM distance: >3 FB   Neck ROM: full  Mouth opening: > = 3 FB Dental:    (+) partials      Pulmonary:Negative Pulmonary ROS breath sounds clear to auscultation                             Cardiovascular:    (+) past MI: > 6 months and no interval change, hyperlipidemia      ECG reviewed  Rhythm: regular    Echocardiogram reviewed    Cleared by cardiology     Beta Blocker:  Dose within 24 Hrs         Neuro/Psych:   Negative Neuro/Psych ROS              GI/Hepatic/Renal: Neg GI/Hepatic/Renal ROS            Endo/Other:    (+) hypothyroidism::., .                 Abdominal:           Vascular: negative vascular ROS. Anesthesia Plan      MAC and regional     ASA 3 - emergent     (US guided Fascia Iliaca Block  GA backup)  Induction: intravenous. MIPS: Prophylactic antiemetics administered. Anesthetic plan and risks discussed with patient. Use of blood products discussed with patient whom consented to blood products. Plan discussed with CRNA.     Attending anesthesiologist reviewed and agrees with Pre Eval content              Caroline Juan MD   9/5/2019

## 2019-09-05 NOTE — ED PROVIDER NOTES
[address] [facility] ED  eMERGENCY dEPARTMENT eNCOUnter      Pt Name: Iris Pantoja  MRN: [id number]  [place] 12/4/1928  Date of evaluation: 9/5/2019  Provider: Uche Hudson MD    CHIEF COMPLAINT       Chief Complaint   Patient presents with   Jh Ao    Hip Pain         HISTORY OF PRESENT ILLNESS   (Location/Symptom, Timing/Onset,Context/Setting, Quality, Duration, Modifying Factors, Severity)  Note limiting factors. Iris Pantoja is a 80 y.o. female who presents to the emergency department for evaluation of possible hip fracture. Patient states that a assisted living like apartment and fell while there as a mechanical fall. She has what appears to be an obvious right hip fracture. brought in by EMS. She has a prior history of left hip fracture with repair. Current pain level is severe involving the right hip. Worse with movement. She denies head or neck injury. She does not take any blood thinners besides aspirin. No related chest or abdominal pain. HPI    NursingNotes were reviewed. REVIEW OF SYSTEMS    (2-9 systems for level 4, 10 or more for level 5)     Review of Systems   Constitutional: Negative for chills and diaphoresis. HENT: Negative for congestion, ear pain, mouth sores and sore throat. Eyes: Negative for photophobia and discharge. Respiratory: Negative for cough, chest tightness, shortness of breath and wheezing. Cardiovascular: Negative for chest pain and palpitations. Gastrointestinal: Negative for abdominal distention, abdominal pain, nausea and vomiting. Endocrine: Negative for cold intolerance. Genitourinary: Negative for difficulty urinating and flank pain. Musculoskeletal: Positive for gait problem. Negative for arthralgias, back pain, myalgias and neck pain. Skin: Negative for pallor and rash. Allergic/Immunologic: Negative for immunocompromised state. Neurological: Negative for dizziness and syncope.    Hematological: Negative for BP: (!) 186/67   (!) 146/79   Pulse: 73   68   Resp: 18   18   Temp:  98.7 °F (37.1 °C)  98.7 °F (37.1 °C)   TempSrc:    Oral   SpO2: 97%   97%   Weight:   112 lb (50.8 kg)    Height:   4' 11\" (1.499 m)         MDM     CONSULTS:  None    PROCEDURES:  Unless otherwise noted below, none     Procedures    FINAL IMPRESSION      1. Closed fracture of right hip requiring operative repair, initial encounter [facility])          DISPOSITION/PLAN   DISPOSITION Decision To Admit 09/05/2019 02:18:14 AM      PATIENT REFERRED TO:  No follow-up provider specified.     DISCHARGE MEDICATIONS:  New Prescriptions    No medications on file          (Please note that portions of this note were completed with a voice recognition program.  Efforts were made to edit the dictations but occasionally words are mis-transcribed.)    Oscar Pickering MD (electronically signed)  Attending Emergency Physician         Oscar Pickering MD  09/05/19 2360

## 2019-09-05 NOTE — FLOWSHEET NOTE
Perfect serve sent to cardiology to verify whether or not they are giving clearance for surgery, awaiting response at this time.  I also notified surgery that we are waiting for cardiac clearance

## 2019-09-06 LAB
ABO/RH: NORMAL
ANION GAP SERPL CALCULATED.3IONS-SCNC: 11 MEQ/L (ref 9–15)
ANTIBODY SCREEN: NORMAL
BLOOD BANK DISPENSE STATUS: NORMAL
BLOOD BANK PRODUCT CODE: NORMAL
BPU ID: NORMAL
BUN BLDV-MCNC: 31 MG/DL (ref 8–23)
CALCIUM SERPL-MCNC: 7 MG/DL (ref 8.5–9.9)
CHLORIDE BLD-SCNC: 103 MEQ/L (ref 95–107)
CO2: 20 MEQ/L (ref 20–31)
CREAT SERPL-MCNC: 1.33 MG/DL (ref 0.5–0.9)
DESCRIPTION BLOOD BANK: NORMAL
EKG ATRIAL RATE: 80 BPM
EKG ATRIAL RATE: 88 BPM
EKG P AXIS: 22 DEGREES
EKG P AXIS: 30 DEGREES
EKG P-R INTERVAL: 156 MS
EKG P-R INTERVAL: 160 MS
EKG Q-T INTERVAL: 390 MS
EKG Q-T INTERVAL: 456 MS
EKG QRS DURATION: 84 MS
EKG QRS DURATION: 86 MS
EKG QTC CALCULATION (BAZETT): 449 MS
EKG QTC CALCULATION (BAZETT): 551 MS
EKG R AXIS: -15 DEGREES
EKG R AXIS: -24 DEGREES
EKG T AXIS: -63 DEGREES
EKG T AXIS: 0 DEGREES
EKG VENTRICULAR RATE: 80 BPM
EKG VENTRICULAR RATE: 88 BPM
GFR AFRICAN AMERICAN: 45.3
GFR NON-AFRICAN AMERICAN: 37.4
GLUCOSE BLD-MCNC: 138 MG/DL (ref 70–99)
HCT VFR BLD CALC: 19.4 % (ref 37–47)
HEMOGLOBIN: 6.3 G/DL (ref 12–16)
MCH RBC QN AUTO: 32.2 PG (ref 27–31.3)
MCHC RBC AUTO-ENTMCNC: 32.7 % (ref 33–37)
MCV RBC AUTO: 98.5 FL (ref 82–100)
PDW BLD-RTO: 13.5 % (ref 11.5–14.5)
PLATELET # BLD: 142 K/UL (ref 130–400)
POTASSIUM REFLEX MAGNESIUM: 4.5 MEQ/L (ref 3.4–4.9)
RBC # BLD: 1.97 M/UL (ref 4.2–5.4)
SODIUM BLD-SCNC: 134 MEQ/L (ref 135–144)
TROPONIN: 0.01 NG/ML (ref 0–0.01)
WBC # BLD: 10.7 K/UL (ref 4.8–10.8)

## 2019-09-06 PROCEDURE — 1210000000 HC MED SURG R&B

## 2019-09-06 PROCEDURE — 2580000003 HC RX 258: Performed by: INTERNAL MEDICINE

## 2019-09-06 PROCEDURE — P9016 RBC LEUKOCYTES REDUCED: HCPCS

## 2019-09-06 PROCEDURE — 86901 BLOOD TYPING SEROLOGIC RH(D): CPT

## 2019-09-06 PROCEDURE — 97110 THERAPEUTIC EXERCISES: CPT

## 2019-09-06 PROCEDURE — 6360000002 HC RX W HCPCS: Performed by: NURSE PRACTITIONER

## 2019-09-06 PROCEDURE — 80048 BASIC METABOLIC PNL TOTAL CA: CPT

## 2019-09-06 PROCEDURE — 93005 ELECTROCARDIOGRAM TRACING: CPT | Performed by: INTERNAL MEDICINE

## 2019-09-06 PROCEDURE — 36415 COLL VENOUS BLD VENIPUNCTURE: CPT

## 2019-09-06 PROCEDURE — 99233 SBSQ HOSP IP/OBS HIGH 50: CPT | Performed by: INTERNAL MEDICINE

## 2019-09-06 PROCEDURE — 2580000003 HC RX 258: Performed by: NURSE PRACTITIONER

## 2019-09-06 PROCEDURE — 36430 TRANSFUSION BLD/BLD COMPNT: CPT

## 2019-09-06 PROCEDURE — 84484 ASSAY OF TROPONIN QUANT: CPT

## 2019-09-06 PROCEDURE — 6370000000 HC RX 637 (ALT 250 FOR IP): Performed by: NURSE PRACTITIONER

## 2019-09-06 PROCEDURE — 85027 COMPLETE CBC AUTOMATED: CPT

## 2019-09-06 PROCEDURE — 97167 OT EVAL HIGH COMPLEX 60 MIN: CPT

## 2019-09-06 PROCEDURE — 2700000000 HC OXYGEN THERAPY PER DAY

## 2019-09-06 PROCEDURE — 97163 PT EVAL HIGH COMPLEX 45 MIN: CPT

## 2019-09-06 PROCEDURE — 86900 BLOOD TYPING SEROLOGIC ABO: CPT

## 2019-09-06 PROCEDURE — 86923 COMPATIBILITY TEST ELECTRIC: CPT

## 2019-09-06 PROCEDURE — 86850 RBC ANTIBODY SCREEN: CPT

## 2019-09-06 RX ORDER — 0.9 % SODIUM CHLORIDE 0.9 %
1000 INTRAVENOUS SOLUTION INTRAVENOUS ONCE
Status: COMPLETED | OUTPATIENT
Start: 2019-09-06 | End: 2019-09-06

## 2019-09-06 RX ORDER — SODIUM CHLORIDE 9 MG/ML
INJECTION, SOLUTION INTRAVENOUS
Status: DISPENSED
Start: 2019-09-06 | End: 2019-09-07

## 2019-09-06 RX ORDER — 0.9 % SODIUM CHLORIDE 0.9 %
250 INTRAVENOUS SOLUTION INTRAVENOUS ONCE
Status: COMPLETED | OUTPATIENT
Start: 2019-09-06 | End: 2019-09-06

## 2019-09-06 RX ADMIN — DORZOLAMIDE HYDROCHLORIDE 1 DROP: 20 SOLUTION/ DROPS OPHTHALMIC at 08:31

## 2019-09-06 RX ADMIN — OXYCODONE HYDROCHLORIDE 2.5 MG: 5 TABLET ORAL at 13:42

## 2019-09-06 RX ADMIN — ATORVASTATIN CALCIUM 10 MG: 10 TABLET, FILM COATED ORAL at 08:30

## 2019-09-06 RX ADMIN — LEVOTHYROXINE SODIUM 100 MCG: 100 TABLET ORAL at 08:32

## 2019-09-06 RX ADMIN — DORZOLAMIDE HYDROCHLORIDE 1 DROP: 20 SOLUTION/ DROPS OPHTHALMIC at 23:26

## 2019-09-06 RX ADMIN — SODIUM CHLORIDE 1000 ML: 9 INJECTION, SOLUTION INTRAVENOUS at 01:28

## 2019-09-06 RX ADMIN — CEFAZOLIN 1 G: 1 INJECTION, POWDER, FOR SOLUTION INTRAMUSCULAR; INTRAVENOUS at 10:29

## 2019-09-06 RX ADMIN — CEFAZOLIN SODIUM 2 G: 1 INJECTION, SOLUTION INTRAVENOUS at 03:24

## 2019-09-06 RX ADMIN — SODIUM CHLORIDE 250 ML: 9 INJECTION, SOLUTION INTRAVENOUS at 10:10

## 2019-09-06 RX ADMIN — SODIUM CHLORIDE 250 ML: 9 INJECTION, SOLUTION INTRAVENOUS at 08:30

## 2019-09-06 RX ADMIN — LATANOPROST 1 DROP: 50 SOLUTION OPHTHALMIC at 08:32

## 2019-09-06 RX ADMIN — CYANOCOBALAMIN TAB 500 MCG 500 MCG: 500 TAB at 08:30

## 2019-09-06 RX ADMIN — SODIUM CHLORIDE: 9 INJECTION, SOLUTION INTRAVENOUS at 23:25

## 2019-09-06 RX ADMIN — CEFAZOLIN 1 G: 1 INJECTION, POWDER, FOR SOLUTION INTRAMUSCULAR; INTRAVENOUS at 18:43

## 2019-09-06 RX ADMIN — OXYCODONE HYDROCHLORIDE 2.5 MG: 5 TABLET ORAL at 08:30

## 2019-09-06 ASSESSMENT — PAIN DESCRIPTION - PAIN TYPE: TYPE: ACUTE PAIN

## 2019-09-06 ASSESSMENT — PAIN SCALES - GENERAL
PAINLEVEL_OUTOF10: 4
PAINLEVEL_OUTOF10: 5
PAINLEVEL_OUTOF10: 0
PAINLEVEL_OUTOF10: 4

## 2019-09-06 ASSESSMENT — PAIN DESCRIPTION - LOCATION: LOCATION: HEAD

## 2019-09-06 ASSESSMENT — PAIN DESCRIPTION - ORIENTATION: ORIENTATION: POSTERIOR

## 2019-09-06 NOTE — PROGRESS NOTES
intervention at this time. Electronically signed by Anahy Ley.  Yasmeen Palacio MD [facility] Director of Cardiology Services and Cardiac Catheterization Laboratory  [facility], [place]   on 9/6/2019 at 8:27 AM

## 2019-09-06 NOTE — DISCHARGE INSTR - COC
Electronically signed by Simran Byrd RN on 9/8/19 at 11:00 AM    CASE MANAGEMENT/SOCIAL WORK SECTION    Inpatient Status Date: ***    Readmission Risk Assessment Score:  Readmission Risk              Risk of Unplanned Readmission:        15           Discharging to Facility/ Agency   · Name: [facility]  · Address [address]:  · Phone:[phone] · [address]    Dialysis Facility (if applicable)   · Name:  · Address:  · Dialysis Schedule:  · Phone:  · Fax:    / signature: {Esignature:[id number]}    PHYSICIAN SECTION    Prognosis: {Prognosis:3703468112}    Condition at Discharge: Elizabeth Saldivar Patient Condition:447833485}    Rehab Potential (if transferring to Rehab): {Prognosis:0541838912}    Recommended Labs or Other Treatments After Discharge: ***    Physician Certification: I certify the above information and transfer of Matthew Aguiar  is necessary for the continuing treatment of the diagnosis listed and that she requires {Admit to Appropriate Level of Care:32561} for {GREATER/LESS:372280389} 30 days.      Update Admission H&P: {CHP DME Changes in TSTZW:242184114}    PHYSICIAN SIGNATURE:  Electronically signed by Nehemias Myrick MD on 9/6/19 at 8:10 AM

## 2019-09-06 NOTE — PROGRESS NOTES
end of eval    Cognition Status:  Cognition  Overall Cognitive Status: [facility]  Cognition Comment: Mildly limited by anxiety    Perception Status:  Perception  Overall Perceptual Status: [facility]    Sensation Status:  Sensation  Overall Sensation Status: [facility]    Vision and Hearing Status:  Vision  Vision: Impaired  Vision Exceptions: Wears glasses at all times  Hearing  Hearing: Within functional limits     ROM:   LUE AROM (degrees)  LUE General AROM: Min AROM at all joints, pt. states that she is limited by old CVA  Left Hand AROM (degrees)  Left Hand AROM: WFL  RUE AROM (degrees)  RUE AROM : WFL  Right Hand AROM (degrees)  Right Hand AROM: WFL    Strength:  LUE Strength  Gross LUE Strength: Exceptions to [facility]  L Hand General: 3+/5  LUE Strength Comment: 3+/5 all planes  RUE Strength  Gross RUE Strength: Exceptions to [facility]  R Hand General: 3+/5  RUE Strength Comment: 3+/5 all planes    Coordination, Tone, Quality of Movement: Tone RUE  RUE Tone: Normotonic  Tone LUE  LUE Tone: Normotonic  Coordination  Movements Are Fluid And Coordinated: No  Coordination and Movement description: Fine motor impairments, Gross motor impairments, Decreased speed, Decreased accuracy, Left UE, Right UE  Quality of Movement Other  Comment: Old LUE CVA, RUE arthritic changes    Hand Dominance:  Hand Dominance  Hand Dominance: Right    ADL Status:  ADL  Feeding: Dependent/Total(Observed family feeding patient)  Grooming: Setup  UE Bathing: Minimal assistance  LE Bathing: Dependent/Total  UE Dressing: Moderate assistance  LE Dressing: Dependent/Total  Toileting: Dependent/Total  Additional Comments: Simulated ADLs as above.  Pt. with significant difficulty due to pain   Toilet Transfers  Toilet Transfers Comments: Anticipate dependent based on other mobility       Functional Mobility:  Functional Mobility  Functional Mobility Comments: Unsafe to attempt  Transfers  Sit to stand: Unable to assess  Stand to sit: Unable to assess  Transfer Comments: much help for eating meals?: A Little  AM-[facility] Inpatient Daily Activity Raw Score: 11  AM-PAC Inpatient ADL T-Scale Score : 29.04  ADL Inpatient CMS 0-100% Score: 70.42    Plan:  Plan  Times per week: 1-3x  Plan weeks: Length of acute stay  Current Treatment Recommendations: Strengthening, Balance Training, Functional Mobility Training, Endurance Training, Cognitive Reorientation, Pain Management, Safety Education & Training, Patient/Caregiver Education & Training, Equipment Evaluation, Education, & procurement, Self-Care / ADL, Home Management Training, Cognitive/Perceptual Training    Goals:   Patient will:    - Improve functional endurance to tolerate/complete 60 mins of ADL's  - Be Setup in UB ADLs   - Be Mod A in LB ADLs  - Be Mod A in ADL transfers without LOB  - Be Mod A in toileting tasks  - Improve R hand fine motor coordination to [facility] in order to manage clothing fasteners/self-care containers in a timely manner  - Improve R UE strength and endurance to 4/5 in order to participate in self-care activities as projected. - Access appropriate D/C site with as few architectural barriers as possible. - Sequence self-care tasks with no verbal cues for safety or weight bearing    Patient Goal: Patient goals : \"I want to move better\"      Discussed and agreed upon: Yes Comments:     Therapy Time:   OT Individual Minutes  Time In: 0955  Time Out: 1330  Minutes: 25    Electronically signed by:     JUAN MIGUEL Guadarrama  9/6/2019, 2:37 PM

## 2019-09-06 NOTE — PROGRESS NOTES
to assess patient. Orders for EKG, CBC, BMP, and Trop were placed. He also ordered for infusion to be increased to 75 mL/hr. After current bag of saline is completed. Also aware of minimal urine output ~50 mL since 1900. 0250: Dr. Alexandra Brothers made aware of abnormal EKG and EKG change from this morning's EKG. Patient denies any chest pain or discomfort. Also informed that the patient's aquacel dressing to her R hip is now newly saturated with blood. 12: Dr Alexandra Brothers awaiting results from CBC to monitor hemoglobin. 0315: Dr. Alexandra Brothers informed that the aquacel is now leaking blood and that the ortho team is going to be paged. He wants informed of labs once results. Will keep updated. 3293: Dr Carla Ba was paged via answering service. 0405: Called lab to inquire about lab results. Hemoglobin is 6.3, hematocrit is 19.4, trop is 0.015. Dr. Alexandra Brothers notified of critical labs. 0407: Second page put out to Dr. Carla Ba regarding critical labs    1444: Dr. Alexandra Brothers called RN, 2 units of blood and type and screen ordered. 2617: Yasmin sortoserved asking to call regarding patient. 5: Dr. Carla Ba paged for the third time, transferred to his phone. Dr. Carla Ba made aware of patients condition and Dr. Mireles Skill orders. Dr. Carla Ba agreed with newly placed orders, and ordered for a pressure dressing to be place to the R hip over the saturated aquacel until ortho team can see the patient. 0530: Patient was repositioned in bed and new linens. Pressure dressing was applied to R hip over Aquacel. Bloody drainage was outlined on dressing for monitoring. New ice applied to hip. Patient still satisfied with heating pad to her neck and head for pain relief. Consent for blood transfusion was signed and explained to patient and daughter-in-law.

## 2019-09-06 NOTE — PROGRESS NOTES
Physical Therapy Med Surg Initial Assessment  Facility/Department: [facility]  Room: I9/U369-17       NAME: Chuyita Dutta  : 1928 (80 y.o.)  MRN: [id number]  CODE STATUS: Full Code    Date of Service: 2019    Patient Diagnosis(es): Hip fracture requiring operative repair, right, closed, initial encounter [facility]) Santy Lopez   Chief Complaint   Patient presents with   [place] Fall    Hip Pain     Patient Active Problem List    Diagnosis Date Noted    Hip fracture requiring operative repair, right, closed, initial encounter ([facility] [address] 2019        History reviewed. No pertinent past medical history. Past Surgical History:   Procedure Laterality Date    HIP FRACTURE SURGERY Right 2019    HIP OPEN REDUCTION INTERNAL FIXATION performed [place] [facility] [place]       Chart Reviewed: Yes  Patient assessed for rehabilitation services?: Yes  Family / Caregiver Present: Yes  General Comment  Comments: Pt awake in bed, agreeable to PT eval    Restrictions:  Restrictions/Precautions: Weight Bearing, Fall Risk  Lower Extremity Weight Bearing Restrictions  Right Lower Extremity Weight Bearing: Partial Weight Bearing  Partial Weight Bearing Percentage Or Pounds: 25%     SUBJECTIVE: Subjective: Pt reports feeling better.   Pre Treatment Pain Screening  Pain at present: 1  Intervention List: Patient able to continue with treatment    Post Treatment Pain Screening:   Pain Screening  Patient Currently in Pain: Yes    Prior Level of Function:  Social/Functional History  Lives With: Spouse  Type of Home: [facility])  Home Access: Level entry  Bathroom Shower/Tub: Walk-in shower  Bathroom Equipment: Shower chair  Home Equipment: [address], 4 wheeled walker  ADL Assistance: Needs assistance(spouse assists)  Homemaking Assistance: Needs assistance  Ambulation Assistance: Independent(cane short distances; rollator to dining room)  Transfer Assistance: Independent  Active : No    OBJECTIVE:

## 2019-09-07 LAB
ALBUMIN SERPL-MCNC: 2.3 G/DL (ref 3.5–4.6)
ANION GAP SERPL CALCULATED.3IONS-SCNC: 10 MEQ/L (ref 9–15)
BASOPHILS ABSOLUTE: 0 K/UL (ref 0–0.2)
BASOPHILS RELATIVE PERCENT: 0.2 %
BUN BLDV-MCNC: 43 MG/DL (ref 8–23)
CALCIUM SERPL-MCNC: 7.2 MG/DL (ref 8.5–9.9)
CHLORIDE BLD-SCNC: 105 MEQ/L (ref 95–107)
CO2: 21 MEQ/L (ref 20–31)
CREAT SERPL-MCNC: 1.01 MG/DL (ref 0.5–0.9)
EOSINOPHILS ABSOLUTE: 0 K/UL (ref 0–0.7)
EOSINOPHILS RELATIVE PERCENT: 0.2 %
GFR AFRICAN AMERICAN: >60
GFR NON-AFRICAN AMERICAN: 51.4
GLUCOSE BLD-MCNC: 165 MG/DL (ref 70–99)
HCT VFR BLD CALC: 23.1 % (ref 37–47)
HCT VFR BLD CALC: 23.2 % (ref 37–47)
HEMOGLOBIN: 7.8 G/DL (ref 12–16)
HEMOGLOBIN: 7.8 G/DL (ref 12–16)
LYMPHOCYTES ABSOLUTE: 0.5 K/UL (ref 1–4.8)
LYMPHOCYTES RELATIVE PERCENT: 5.9 %
MAGNESIUM: 1.9 MG/DL (ref 1.7–2.4)
MCH RBC QN AUTO: 31.6 PG (ref 27–31.3)
MCH RBC QN AUTO: 31.8 PG (ref 27–31.3)
MCHC RBC AUTO-ENTMCNC: 33.8 % (ref 33–37)
MCHC RBC AUTO-ENTMCNC: 33.8 % (ref 33–37)
MCV RBC AUTO: 93.6 FL (ref 82–100)
MCV RBC AUTO: 94 FL (ref 82–100)
MONOCYTES ABSOLUTE: 1.1 K/UL (ref 0.2–0.8)
MONOCYTES RELATIVE PERCENT: 12.3 %
NEUTROPHILS ABSOLUTE: 7.1 K/UL (ref 1.4–6.5)
NEUTROPHILS RELATIVE PERCENT: 81.4 %
PDW BLD-RTO: 14.7 % (ref 11.5–14.5)
PDW BLD-RTO: 14.9 % (ref 11.5–14.5)
PHOSPHORUS: 2.3 MG/DL (ref 2.3–4.8)
PLATELET # BLD: 124 K/UL (ref 130–400)
PLATELET # BLD: 132 K/UL (ref 130–400)
POTASSIUM SERPL-SCNC: 4.2 MEQ/L (ref 3.4–4.9)
RBC # BLD: 2.46 M/UL (ref 4.2–5.4)
RBC # BLD: 2.48 M/UL (ref 4.2–5.4)
SODIUM BLD-SCNC: 136 MEQ/L (ref 135–144)
WBC # BLD: 7.1 K/UL (ref 4.8–10.8)
WBC # BLD: 8.7 K/UL (ref 4.8–10.8)

## 2019-09-07 PROCEDURE — 6370000000 HC RX 637 (ALT 250 FOR IP): Performed by: NURSE PRACTITIONER

## 2019-09-07 PROCEDURE — 85025 COMPLETE CBC W/AUTO DIFF WBC: CPT

## 2019-09-07 PROCEDURE — 83735 ASSAY OF MAGNESIUM: CPT

## 2019-09-07 PROCEDURE — 80069 RENAL FUNCTION PANEL: CPT

## 2019-09-07 PROCEDURE — 2580000003 HC RX 258: Performed by: NURSE PRACTITIONER

## 2019-09-07 PROCEDURE — 97110 THERAPEUTIC EXERCISES: CPT

## 2019-09-07 PROCEDURE — 1210000000 HC MED SURG R&B

## 2019-09-07 PROCEDURE — 85027 COMPLETE CBC AUTOMATED: CPT

## 2019-09-07 PROCEDURE — 97535 SELF CARE MNGMENT TRAINING: CPT

## 2019-09-07 PROCEDURE — 6360000002 HC RX W HCPCS: Performed by: NURSE PRACTITIONER

## 2019-09-07 PROCEDURE — 36415 COLL VENOUS BLD VENIPUNCTURE: CPT

## 2019-09-07 RX ADMIN — Medication 10 ML: at 20:32

## 2019-09-07 RX ADMIN — LEVOTHYROXINE SODIUM 100 MCG: 100 TABLET ORAL at 06:03

## 2019-09-07 RX ADMIN — ATORVASTATIN CALCIUM 10 MG: 10 TABLET, FILM COATED ORAL at 09:33

## 2019-09-07 RX ADMIN — DORZOLAMIDE HYDROCHLORIDE 1 DROP: 20 SOLUTION/ DROPS OPHTHALMIC at 20:32

## 2019-09-07 RX ADMIN — CYANOCOBALAMIN TAB 500 MCG 500 MCG: 500 TAB at 09:33

## 2019-09-07 RX ADMIN — CEFAZOLIN 1 G: 1 INJECTION, POWDER, FOR SOLUTION INTRAMUSCULAR; INTRAVENOUS at 02:38

## 2019-09-07 RX ADMIN — OXYCODONE HYDROCHLORIDE 5 MG: 5 TABLET ORAL at 02:42

## 2019-09-07 RX ADMIN — POLYETHYLENE GLYCOL 3350 12 G: 17 POWDER, FOR SOLUTION ORAL at 09:33

## 2019-09-07 ASSESSMENT — PAIN DESCRIPTION - LOCATION: LOCATION: HIP

## 2019-09-07 ASSESSMENT — PAIN SCALES - GENERAL
PAINLEVEL_OUTOF10: 9
PAINLEVEL_OUTOF10: 9

## 2019-09-07 ASSESSMENT — PAIN DESCRIPTION - ORIENTATION: ORIENTATION: RIGHT

## 2019-09-07 ASSESSMENT — PAIN DESCRIPTION - PAIN TYPE: TYPE: ACUTE PAIN

## 2019-09-07 NOTE — PROGRESS NOTES
0.015*       Urinalysis:      Lab Results   Component Value Date    NITRU Negative 08/12/2019    WBCUA 0-2 08/12/2019    BACTERIA Negative 08/12/2019    RBCUA 0-2 08/12/2019    BLOODU Negative 08/12/2019    SPECGRAV 1.017 08/12/2019    GLUCOSEU Negative 08/12/2019    GLUCOSEU NEG 11/08/2011       Radiology:  [facility] FOR SURGICAL PROCEDURES   Final Result      XR CHEST PORTABLE   Final Result   NO ACUTE CARDIOPULMONARY ABNORMALITY. NO CHANGE. XR HIP RIGHT (2-3 VIEWS)   Final Result   1. ACUTE SEVERELY COMMINUTED ANGULATED INTERTROCHANTERIC FRACTURE OF RIGHT FEMUR. 2. ALSO, SUSPICIOUS FOR AN ESSENTIALLY NONDISPLACED RIGHT PUBIC BONE FRACTURE. 3. OLD HEALED LEFT HIP FRACTURE WITH ORTHOPEDIC HARDWARE IN PLACE. XR FEMUR RIGHT (MIN 2 VIEWS)   Final Result   SEVERELY COMMINUTED ANGULATED INTERTROCHANTERIC FRACTURE OF THE RIGHT FEMUR.               Assessment/Plan:    80 y.o. female with PMH of CAD/MI, CVA in 2016 with left sided weakness, HTN, hypothyroidism, severe glaucoma of the left eye, PUD, MCTD, psoriatic arthritis who presented with:     Right hip fracture s/p fall  - s/p ORIF on 9/5  - POD 2  - management per primary service    Acute blood loss anemia  - due to perioperative blood loss  - Hb improved s/p transfusion of 2 units of PRBCs  - ASA and Lovenox on hold  - monitor H/H    NSTEMI  - due to hypotension,anemia,blood loss  - conservative management per cardiology    NKECHI  - due to volume depletion, blood loss, hypotension  - continue volume repletion  - monitor renal function      HTN  - not well controlled on arrival, likely related to pain  - improved  - hold labetalol due to hypotension     CVA with left sided weakness  - hold ASA due to anemia  - resume when Abida Cornelius with ortho  - continue statin therapy     Hypothyroidism  - continue synthroid     Severe glaucoma of the left eye  - continue eye drops     Disposition - SNF          Electronically signed by Emily Venegas MD on 9/7/2019 at 12:30

## 2019-09-07 NOTE — PROGRESS NOTES
embolics. ASSESSMENT:  Body structures, Functions, Activity limitations: Decreased functional mobility ; Decreased strength;Decreased endurance;Decreased balance; Increased Pain;Decreased ROM    Assessment: Pt limited by pain, unable to progress to standing but did tolerate increased sitting on EOB. Activity Tolerance  Activity Tolerance: Patient limited by pain       Discharge Recommendations:  Patient would benefit from continued therapy after discharge    Goals:  Short term goals  Short term goal 1: pt to tolerate sitting >5 min with SBA at EOB  Short term goal 2: pt to be indep with HEP  Short term goal 3: pt to STS with mod A   Long term goals  Long term goal 1: pt to stand >1 min at [facility] maintaining 25% PWB RLE  Long term goal 2: pt to be min A for pivot transfers  Patient Goals   Patient goals : agreeable to PT POC    PLAN:   Plan  Times per week: 5-7  Times per day: Daily  Current Treatment Recommendations: Strengthening, Functional Mobility Training, Neuromuscular Re-education, Equipment Evaluation, Education, & procurement, Home Exercise Program, Transfer Training, Gait Training, Safety Education & Training, Balance Training, Endurance Training, Stair training, Patient/Caregiver Education & Training, Pain Management, Positioning  Safety Devices  Type of devices:  All fall risk precautions in place, Bed alarm in place, Left in bed, Call light within reach     [facility] (6 CLICK) Chris 95 Raw Score : 8      Therapy Time   Individual   Time In 1140   Time Out 1203   Minutes 23      BM: 15  Therex: DEMAR Kaye, 09/07/19 at 12:26 PM

## 2019-09-08 ENCOUNTER — APPOINTMENT (OUTPATIENT)
Dept: ULTRASOUND IMAGING | Age: 84
DRG: 480 | End: 2019-09-08
Payer: MEDICARE

## 2019-09-08 VITALS
SYSTOLIC BLOOD PRESSURE: 156 MMHG | TEMPERATURE: 99.1 F | DIASTOLIC BLOOD PRESSURE: 67 MMHG | HEART RATE: 87 BPM | WEIGHT: 117 LBS | HEIGHT: 59 IN | RESPIRATION RATE: 16 BRPM | BODY MASS INDEX: 23.59 KG/M2 | OXYGEN SATURATION: 95 %

## 2019-09-08 LAB
ALBUMIN SERPL-MCNC: 2.2 G/DL (ref 3.5–4.6)
ANION GAP SERPL CALCULATED.3IONS-SCNC: 5 MEQ/L (ref 9–15)
BACTERIA: NEGATIVE /HPF
BILIRUBIN URINE: NEGATIVE
BLOOD, URINE: ABNORMAL
BUN BLDV-MCNC: 36 MG/DL (ref 8–23)
CALCIUM SERPL-MCNC: 7.7 MG/DL (ref 8.5–9.9)
CHLORIDE BLD-SCNC: 106 MEQ/L (ref 95–107)
CLARITY: CLEAR
CO2: 25 MEQ/L (ref 20–31)
COLOR: YELLOW
CREAT SERPL-MCNC: 0.74 MG/DL (ref 0.5–0.9)
EPITHELIAL CELLS, UA: ABNORMAL /HPF (ref 0–5)
GFR AFRICAN AMERICAN: >60
GFR NON-AFRICAN AMERICAN: >60
GLUCOSE BLD-MCNC: 124 MG/DL (ref 70–99)
GLUCOSE URINE: NEGATIVE MG/DL
HCT VFR BLD CALC: 22.9 % (ref 37–47)
HEMOGLOBIN: 7.8 G/DL (ref 12–16)
HYALINE CASTS: ABNORMAL /HPF (ref 0–5)
KETONES, URINE: NEGATIVE MG/DL
LEUKOCYTE ESTERASE, URINE: NEGATIVE
MAGNESIUM: 2 MG/DL (ref 1.7–2.4)
MCH RBC QN AUTO: 32 PG (ref 27–31.3)
MCHC RBC AUTO-ENTMCNC: 34 % (ref 33–37)
MCV RBC AUTO: 94.2 FL (ref 82–100)
NITRITE, URINE: NEGATIVE
PDW BLD-RTO: 14.7 % (ref 11.5–14.5)
PH UA: 5 (ref 5–9)
PHOSPHORUS: 2 MG/DL (ref 2.3–4.8)
PLATELET # BLD: 140 K/UL (ref 130–400)
POTASSIUM SERPL-SCNC: 4.3 MEQ/L (ref 3.4–4.9)
PROTEIN UA: 30 MG/DL
RBC # BLD: 2.44 M/UL (ref 4.2–5.4)
RBC UA: ABNORMAL /HPF (ref 0–2)
SODIUM BLD-SCNC: 136 MEQ/L (ref 135–144)
SPECIFIC GRAVITY UA: 1.02 (ref 1–1.03)
UROBILINOGEN, URINE: 0.2 E.U./DL
WBC # BLD: 9.4 K/UL (ref 4.8–10.8)
WBC UA: ABNORMAL /HPF (ref 0–5)

## 2019-09-08 PROCEDURE — 85027 COMPLETE CBC AUTOMATED: CPT

## 2019-09-08 PROCEDURE — 87086 URINE CULTURE/COLONY COUNT: CPT

## 2019-09-08 PROCEDURE — 6360000002 HC RX W HCPCS: Performed by: NURSE PRACTITIONER

## 2019-09-08 PROCEDURE — 81001 URINALYSIS AUTO W/SCOPE: CPT

## 2019-09-08 PROCEDURE — 80069 RENAL FUNCTION PANEL: CPT

## 2019-09-08 PROCEDURE — 36415 COLL VENOUS BLD VENIPUNCTURE: CPT

## 2019-09-08 PROCEDURE — 97535 SELF CARE MNGMENT TRAINING: CPT

## 2019-09-08 PROCEDURE — 93970 EXTREMITY STUDY: CPT

## 2019-09-08 PROCEDURE — 93010 ELECTROCARDIOGRAM REPORT: CPT | Performed by: INTERNAL MEDICINE

## 2019-09-08 PROCEDURE — 6370000000 HC RX 637 (ALT 250 FOR IP): Performed by: NURSE PRACTITIONER

## 2019-09-08 PROCEDURE — 83735 ASSAY OF MAGNESIUM: CPT

## 2019-09-08 RX ORDER — CEPHALEXIN 500 MG/1
500 CAPSULE ORAL 3 TIMES DAILY
Qty: 21 CAPSULE | Refills: 0
Start: 2019-09-08 | End: 2019-09-15

## 2019-09-08 RX ORDER — FUROSEMIDE 10 MG/ML
20 INJECTION INTRAMUSCULAR; INTRAVENOUS ONCE
Status: COMPLETED | OUTPATIENT
Start: 2019-09-08 | End: 2019-09-08

## 2019-09-08 RX ORDER — OXYCODONE HYDROCHLORIDE 5 MG/1
5 TABLET ORAL EVERY 4 HOURS PRN
Qty: 40 TABLET | Refills: 0 | Status: SHIPPED | OUTPATIENT
Start: 2019-09-08 | End: 2019-09-22

## 2019-09-08 RX ORDER — ASPIRIN 81 MG/1
81 TABLET ORAL 2 TIMES DAILY
Qty: 60 TABLET | Refills: 0 | Status: ON HOLD
Start: 2019-09-08 | End: 2021-09-06

## 2019-09-08 RX ADMIN — POLYETHYLENE GLYCOL 3350 12 G: 17 POWDER, FOR SOLUTION ORAL at 08:06

## 2019-09-08 RX ADMIN — FUROSEMIDE 20 MG: 10 INJECTION, SOLUTION INTRAMUSCULAR; INTRAVENOUS at 11:19

## 2019-09-08 RX ADMIN — DORZOLAMIDE HYDROCHLORIDE 1 DROP: 20 SOLUTION/ DROPS OPHTHALMIC at 08:06

## 2019-09-08 RX ADMIN — LATANOPROST 1 DROP: 50 SOLUTION OPHTHALMIC at 08:06

## 2019-09-08 RX ADMIN — LEVOTHYROXINE SODIUM 100 MCG: 100 TABLET ORAL at 06:12

## 2019-09-08 RX ADMIN — ATORVASTATIN CALCIUM 10 MG: 10 TABLET, FILM COATED ORAL at 08:06

## 2019-09-08 RX ADMIN — CYANOCOBALAMIN TAB 500 MCG 500 MCG: 500 TAB at 08:06

## 2019-09-08 ASSESSMENT — PAIN DESCRIPTION - DESCRIPTORS: DESCRIPTORS: ACHING

## 2019-09-08 ASSESSMENT — PAIN SCALES - GENERAL
PAINLEVEL_OUTOF10: 2
PAINLEVEL_OUTOF10: 5

## 2019-09-08 ASSESSMENT — PAIN DESCRIPTION - ORIENTATION: ORIENTATION: RIGHT

## 2019-09-08 ASSESSMENT — PAIN DESCRIPTION - LOCATION: LOCATION: HIP

## 2019-09-08 NOTE — PROGRESS NOTES
Hip surgery    Progress Note    Subjective:     Post-Operative Day: 3 Status Post right TFN  Systemic or Specific Complaints:No Complaints    Objective:     CURRENT VITALS:  BP (!) 156/67   Pulse 87   Temp 99.1 °F (37.3 °C) (Oral)   Resp 16   Ht 4' 11\" (1.499 m)   Wt 117 lb (53.1 kg)   SpO2 95%   BMI 23.63 kg/m²     General: alert, appears stated age and cooperative   Wound: Wound clean and dry no evidence of infection. Motion: Painful range of Motion   DVT Exam: No evidence of DVT seen on physical exam.       NVI in lower extremity. Thigh swollen but soft. Moving foot and ankle. Data Review  Recent Labs     09/07/19  0522 09/07/19  1933 09/08/19  0513   WBC 7.1 8.7 9.4   RBC 2.48* 2.46* 2.44*   HGB 7.8* 7.8* 7.8*   HCT 23.2* 23.1* 22.9*   MCV 93.6 94.0 94.2   MCH 31.6* 31.8* 32.0*   MCHC 33.8 33.8 34.0   RDW 14.7* 14.9* 14.7*   * 132 140       Assessment:     Status Post right TFN. Doing well postoperatively.  Pt did have acute blood loss anemia-  hgb now stabilizing,    pt';s dressing was changed, no bleeding noted   PICTURES of wound and dressing attached to note   ok for d/c to Skilled facility   pt would like alamo left in d/t inability to get to bedside commode   we spoke about risks and benefits   we will send new ua and cover pt with prophylactic antibiotic   pt's family present at bedside   please reassess the need for alamo after 72 hr    Plan:      1: Discharge today, Return to Clinic:  :  2:  Continue Deep venous thrombosis prophylaxis   3:  Continue physical therapy  4:  Continue Pain Control

## 2019-09-08 NOTE — PROGRESS NOTES
2044 Dr Vela Channel made aware of hgb = 7.8. hct = 23.1, states will follow up and repeat labs in AM, no further orders received

## 2019-09-10 LAB — URINE CULTURE, ROUTINE: NORMAL

## 2019-09-17 NOTE — CONSULTS
[address]                      [facility], [address] [facility]                                  CONSULTATION    PATIENT NAME: Alfonso Heard                 :        1928  MED REC NO:   [id number]                            ROOM:       [id number]  ACCOUNT NO:   [de-identified]                           ADMIT DATE: 2019  PROVIDER:     Tamara Aguirre MD    CONSULT DATE:  2019    REASON FOR CONSULTATION:  Right intertrochanteric femur fracture. HISTORY OF PRESENT ILLNESS:  This is a 24-year-old female who fell  injuring her left hip resulting in a displaced intertrochanteric femur  fracture. She was admitted. Orthopedic was consulted for fracture care  and management. The patient is complaining of mild-to-moderate aching  pain and discomfort in the right hip. She denies injuries to the  remainder of extremities. _____ No other specific concerns. PHYSICAL EXAMINATION:  GENERAL:  This is a thin elderly female in no acute distress, alert and  oriented x3, responds appropriately to questioning. Good mood and  normal affect. HEENT:  Head is atraumatic. Trachea is midline. Extraocular eye  muscles are intact. CHEST:  Respirations regular. No audible wheeze. ABDOMEN:  Soft and nontender. CARDIOVASCULAR:  Pulse regular to extremity. EXTREMITIES:  Her right lower extremity is neurovascularly intact with  palpable pedal pulse, warm and well perfused. The skin is intact. Sensation intact to light touch. Limited strength and stability  secondary to fracture. Her bilateral upper extremities and left lower  extremity show full passive motion. No pain. No deformity. X-ray shows displaced intertrochanteric femur fracture with moderate  comminution of right intertrochanteric femur fracture. I have recommended surgery for cephalomedullary nail fixation for the  right intertrochanteric fracture.   Risks, benefits, and alternatives of  surgery were

## 2019-09-17 NOTE — OP NOTE
[address]                      [address], [address] [facility]                                OPERATIVE REPORT    PATIENT NAME: Edwardo Garcia                 :        1928  MED REC NO:   [id number]                            ROOM:       [id number]  ACCOUNT NO:   [de-identified]                           ADMIT DATE: 2019  PROVIDER:     Minor Arce MD    DATE OF PROCEDURE:  2019    PREOPERATIVE DIAGNOSIS:  Right intertrochanteric femur fracture. POSTOPERATIVE DIAGNOSIS:  Right intertrochanteric femur fracture. OPERATION PERFORMED:  Cephalomedullary nail fixation for right  intertrochanteric femur fracture. SURGEON:  Minor Arce MD    FIRST ASSISTANT:  Thi Nagy PA-C, was present throughout the entire  case. Given the nature of the disease process and the procedure, a  skilled surgical first assistant was necessary during the case. The  assistant was necessary to hold retractors and manipulate the extremity  during the procedure. A certified scrub tech was at the back table  managing the instruments and supplies for the surgical case. ANESTHESIA:  General anesthesia with regional block. COMPLICATIONS:  None apparent. ESTIMATED BLOOD LOSS:  300 mL. TOURNIQUET:  No tourniquet. SPECIMENS:  No specimens. IMPLANTS:  Synthes TFN alpha 10 mm nail, 103 degrees with a 90 mm  helical blade, 38 mm distal screw. FINDINGS:  The patient had a complex intertrochanteric femur fracture  with displacement of the greater trochanter. We got length and  alignment and held with cephalomedullary nail fixation. PLAN:  The patient will be partial weightbearing, 25% on the right lower  extremity. Orthopedic followup in two weeks. Aspirin for DVT  prophylaxis. HISTORY:  A 51-year-old female who fell and injured her right hip  resulting in displaced intertrochanteric femur fracture. She was  recommended for surgery.   Risks, benefits, and alternatives of the  surgery were discussed including, but not limited to infection,  bleeding, neurovascular injury, persistent pain, and dysfunction. We  discussed specifically hardware-related complications including cutout,  failure, and breakage, recurrent instability, posttraumatic arthritis,  stiffness, loss of function or motion, need for additional surgeries,  wound healing complications, dehiscence, infection, failure,  cardiovascular and pulmonary complications from anesthesia including  death and DVT. The patient and family accepted these risks. OPERATIVE PROCEDURE:  The patient was identified in the preop area. The  right lower extremity was marked and confirmed by the patient and family  as the patient's operative site, brought back to the operating room, and  anesthetized under general anesthesia as a regional nerve block. The  right lower extremity was then prepped and draped in a standard sterile  fashion. The patient, site, and procedure were confirmed with time-out,  everyone in the room agreed. Preoperative antibiotics were given. She  was placed in a well-padded peroneal post and traction boot. We then  obtained a provisional reduction in terms of length and alignment, but  it was inadequate based on her initial deformity. We then proceeded  with an open reduction. She has a significant sagittal plane deformity  and flexion of the proximal fragments. We made a separate lateral  fascial incision. We placed a provisional guidewire in the  center-center position at the lateral aspect of the femoral neck to this  intertroch region, it was advanced in the AP and lateral planes on the  centered position within the medullary canal.  Once this was done, we  then reamed over a guidewire assistance with a rigid reamer and soft  tissue protector. We placed some deep clamps over the front of the  proximal femur. Blunt Hohmann retractors were placed, confirmed length  and alignment.   We then placed a 10 mm nail with center-center position  within the femoral head and neck. Provisional guidewire was placed. Once we were satisfied with the location of the lag screw, then placed a  derotational pin over guidewire assistance. We then drilled a lateral  cortex, malleted in a 90 mm helical blade. Once this was adequately  seated, we then locked it proximally after it was compressed. This was  an intermediate length nail with distally placed a bicortical locking  screw through a separate fascial incision. Final fluoroscopic images  were taken. All instrumentation was accounted for. The wounds were  irrigated with normal saline. The fascia was closed with #1 Vicryl, 2-0  subcu, and skin staples shut. The patient had four separate fascial  incisions, we had to close given the complexity of her fracture. She  was then awoken from anesthesia and sent to the PACU in stable  condition.         Cale Rosales MD    D: 09/16/2019 18:23:49       T: 09/17/2019 3:10:26     [id number]  Job#: [id number]     Doc#: [id number]    CC:

## 2019-09-19 ENCOUNTER — HOSPITAL ENCOUNTER (OUTPATIENT)
Dept: GENERAL RADIOLOGY | Age: 84
Discharge: HOME | End: 2019-09-21
Payer: COMMERCIAL

## 2019-09-19 DIAGNOSIS — S72.001D CLOSED FRACTURE OF RIGHT HIP WITH ROUTINE HEALING, SUBSEQUENT ENCOUNTER: ICD-10-CM

## 2019-09-19 PROCEDURE — 72170 X-RAY EXAM OF PELVIS: CPT

## 2019-09-19 PROCEDURE — 73501 X-RAY EXAM HIP UNI 1 VIEW: CPT

## 2019-09-28 NOTE — DISCHARGE SUMMARY
Discharge summary  This patient Juju Johnson was admitted to the hospital on 9/5/2019  After sustaining a fall and fracturing her hip. Pt then underwent clearances and had  Procedure(s) (LRB):  HIP OPEN REDUCTION INTERNAL FIXATION (Right) without complications. During the postoperative period,while in hospital, patient was medically managed by the hospitalist. Please see medial notes and H&P for patients additional diagnoses. Ortho agrees with all medical diagnoses and treatments while patient in hospital.  No significant or unexpected findings or abnormal ortho imaging were noted during the hospital stay    Hospital course  Patient tolerated surgical procedure well and there was no complications. Patient progressed adequtly through their recovery during hospital stay including PT and rehabilitation. DVT prophylaxis was implemented POD#1  Patient was then D/C on 9/8/2019 to Skilled nursing facility  in stable condition. Patient was instructed on the use of pain medications, the signs and symptoms of infection, and was given our number to call should they have any questions or concerns following discharge.

## 2019-10-10 LAB
HCT VFR BLD CALC: 25.3 % (ref 37–47)
HEMOGLOBIN: 8.1 G/DL (ref 12–16)
MCH RBC QN AUTO: 31.7 PG (ref 27–31.3)
MCHC RBC AUTO-ENTMCNC: 32.2 % (ref 33–37)
MCV RBC AUTO: 98.4 FL (ref 82–100)
PDW BLD-RTO: 16.9 % (ref 11.5–14.5)
PLATELET # BLD: 273 K/UL (ref 130–400)
RBC # BLD: 2.57 M/UL (ref 4.2–5.4)
WBC # BLD: 4.5 K/UL (ref 4.8–10.8)

## 2019-10-17 ENCOUNTER — HOSPITAL ENCOUNTER (OUTPATIENT)
Dept: GENERAL RADIOLOGY | Age: 84
Discharge: HOME | End: 2019-10-19
Payer: COMMERCIAL

## 2019-10-17 DIAGNOSIS — S72.001S CLOSED FRACTURE OF NECK OF RIGHT FEMUR, SEQUELA: ICD-10-CM

## 2019-10-17 PROCEDURE — 73501 X-RAY EXAM HIP UNI 1 VIEW: CPT

## 2019-11-14 ENCOUNTER — HOSPITAL ENCOUNTER (OUTPATIENT)
Dept: ORTHOPEDIC SURGERY | Age: 84
Discharge: HOME | End: 2019-11-16
Payer: MEDICARE

## 2019-11-14 DIAGNOSIS — S72.009D CLOSED FRACTURE OF HIP WITH ROUTINE HEALING, UNSPECIFIED LATERALITY, SUBSEQUENT ENCOUNTER: ICD-10-CM

## 2019-11-14 PROCEDURE — 73502 X-RAY EXAM HIP UNI 2-3 VIEWS: CPT

## 2021-09-06 ENCOUNTER — APPOINTMENT (OUTPATIENT)
Dept: CT IMAGING | Age: 86
DRG: 481 | End: 2021-09-06
Payer: MEDICARE

## 2021-09-06 ENCOUNTER — HOSPITAL ENCOUNTER (INPATIENT)
Age: 86
LOS: 4 days | Discharge: ANOTHER HOSPITAL | DRG: 481 | End: 2021-09-10
Attending: STUDENT IN AN ORGANIZED HEALTH CARE EDUCATION/TRAINING PROGRAM | Admitting: ORTHOPAEDIC SURGERY
Payer: MEDICARE

## 2021-09-06 ENCOUNTER — APPOINTMENT (OUTPATIENT)
Dept: GENERAL RADIOLOGY | Age: 86
DRG: 481 | End: 2021-09-06
Payer: MEDICARE

## 2021-09-06 DIAGNOSIS — S72.352P: ICD-10-CM

## 2021-09-06 DIAGNOSIS — Z96.649 PERI-PROSTHETIC FRACTURE OF SHAFT OF FEMUR: Primary | ICD-10-CM

## 2021-09-06 DIAGNOSIS — R52 PAIN: ICD-10-CM
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
## 2021-09-06 DIAGNOSIS — E87.1 HYPONATREMIA: ICD-10-CM

## 2021-09-06 DIAGNOSIS — Z66 DNR (DO NOT RESUSCITATE): ICD-10-CM

## 2021-09-06 DIAGNOSIS — M97.8XXA PERI-PROSTHETIC FRACTURE OF SHAFT OF FEMUR: Primary | ICD-10-CM

## 2021-09-06 LAB
ABO/RH: NORMAL
ALBUMIN SERPL-MCNC: 3.6 G/DL (ref 3.5–4.6)
ALP BLD-CCNC: 57 U/L (ref 40–130)
ALT SERPL-CCNC: 9 U/L (ref 0–33)
ANION GAP SERPL CALCULATED.3IONS-SCNC: 8 MEQ/L (ref 9–15)
ANTIBODY SCREEN: NORMAL
APTT: 32.9 SEC (ref 24.4–36.8)
AST SERPL-CCNC: 14 U/L (ref 0–35)
BASOPHILS ABSOLUTE: 0 K/UL (ref 0–0.2)
BASOPHILS RELATIVE PERCENT: 0.1 %
BILIRUB SERPL-MCNC: 0.3 MG/DL (ref 0.2–0.7)
BUN BLDV-MCNC: 12 MG/DL (ref 8–23)
CALCIUM SERPL-MCNC: 8.8 MG/DL (ref 8.5–9.9)
CHLORIDE BLD-SCNC: 92 MEQ/L (ref 95–107)
CO2: 29 MEQ/L (ref 20–31)
CREAT SERPL-MCNC: 0.52 MG/DL (ref 0.5–0.9)
EOSINOPHILS ABSOLUTE: 0 K/UL (ref 0–0.7)
EOSINOPHILS RELATIVE PERCENT: 0.1 %
GFR AFRICAN AMERICAN: >60
GFR NON-AFRICAN AMERICAN: >60
GLOBULIN: 2 G/DL (ref 2.3–3.5)
GLUCOSE BLD-MCNC: 106 MG/DL (ref 70–99)
HCT VFR BLD CALC: 27.3 % (ref 37–47)
HCT VFR BLD CALC: 33.9 % (ref 37–47)
HEMOGLOBIN: 11.6 G/DL (ref 12–16)
HEMOGLOBIN: 9.5 G/DL (ref 12–16)
INR BLD: 1.1
LYMPHOCYTES ABSOLUTE: 0.4 K/UL (ref 1–4.8)
LYMPHOCYTES RELATIVE PERCENT: 3.8 %
MCH RBC QN AUTO: 33.5 PG (ref 27–31.3)
MCH RBC QN AUTO: 33.8 PG (ref 27–31.3)
MCHC RBC AUTO-ENTMCNC: 34.1 % (ref 33–37)
MCHC RBC AUTO-ENTMCNC: 34.7 % (ref 33–37)
MCV RBC AUTO: 97.4 FL (ref 82–100)
MCV RBC AUTO: 98.3 FL (ref 82–100)
MONOCYTES ABSOLUTE: 0.9 K/UL (ref 0.2–0.8)
MONOCYTES RELATIVE PERCENT: 8.7 %
NEUTROPHILS ABSOLUTE: 9.3 K/UL (ref 1.4–6.5)
NEUTROPHILS RELATIVE PERCENT: 87.3 %
PDW BLD-RTO: 13.1 % (ref 11.5–14.5)
PDW BLD-RTO: 13.6 % (ref 11.5–14.5)
PLATELET # BLD: 189 K/UL (ref 130–400)
PLATELET # BLD: 196 K/UL (ref 130–400)
POTASSIUM SERPL-SCNC: 4 MEQ/L (ref 3.4–4.9)
PROTHROMBIN TIME: 14.3 SEC (ref 12.3–14.9)
RBC # BLD: 2.8 M/UL (ref 4.2–5.4)
RBC # BLD: 3.45 M/UL (ref 4.2–5.4)
SARS-COV-2, NAAT: NOT DETECTED
SODIUM BLD-SCNC: 129 MEQ/L (ref 135–144)
TOTAL PROTEIN: 5.6 G/DL (ref 6.3–8)
TSH SERPL DL<=0.05 MIU/L-ACNC: 1.22 UIU/ML (ref 0.44–3.86)
WBC # BLD: 10.7 K/UL (ref 4.8–10.8)
WBC # BLD: 4.1 K/UL (ref 4.8–10.8)

## 2021-09-06 PROCEDURE — 86901 BLOOD TYPING SEROLOGIC RH(D): CPT

## 2021-09-06 PROCEDURE — 84443 ASSAY THYROID STIM HORMONE: CPT

## 2021-09-06 PROCEDURE — 2580000003 HC RX 258: Performed by: INTERNAL MEDICINE

## 2021-09-06 PROCEDURE — 6370000000 HC RX 637 (ALT 250 FOR IP): Performed by: PHYSICIAN ASSISTANT

## 2021-09-06 PROCEDURE — 2500000003 HC RX 250 WO HCPCS: Performed by: STUDENT IN AN ORGANIZED HEALTH CARE EDUCATION/TRAINING PROGRAM

## 2021-09-06 PROCEDURE — 93005 ELECTROCARDIOGRAM TRACING: CPT | Performed by: PHYSICIAN ASSISTANT

## 2021-09-06 PROCEDURE — 73552 X-RAY EXAM OF FEMUR 2/>: CPT

## 2021-09-06 PROCEDURE — 1210000000 HC MED SURG R&B

## 2021-09-06 PROCEDURE — 72170 X-RAY EXAM OF PELVIS: CPT

## 2021-09-06 PROCEDURE — 73590 X-RAY EXAM OF LOWER LEG: CPT

## 2021-09-06 PROCEDURE — 6830039000 HC L3 TRAUMA ALERT

## 2021-09-06 PROCEDURE — 85025 COMPLETE CBC W/AUTO DIFF WBC: CPT

## 2021-09-06 PROCEDURE — 85730 THROMBOPLASTIN TIME PARTIAL: CPT

## 2021-09-06 PROCEDURE — 36415 COLL VENOUS BLD VENIPUNCTURE: CPT

## 2021-09-06 PROCEDURE — 87635 SARS-COV-2 COVID-19 AMP PRB: CPT

## 2021-09-06 PROCEDURE — 73700 CT LOWER EXTREMITY W/O DYE: CPT

## 2021-09-06 PROCEDURE — 70450 CT HEAD/BRAIN W/O DYE: CPT

## 2021-09-06 PROCEDURE — 86850 RBC ANTIBODY SCREEN: CPT

## 2021-09-06 PROCEDURE — 85027 COMPLETE CBC AUTOMATED: CPT

## 2021-09-06 PROCEDURE — 2580000003 HC RX 258: Performed by: PHYSICIAN ASSISTANT

## 2021-09-06 PROCEDURE — P9016 RBC LEUKOCYTES REDUCED: HCPCS

## 2021-09-06 PROCEDURE — 99285 EMERGENCY DEPT VISIT HI MDM: CPT

## 2021-09-06 PROCEDURE — 71045 X-RAY EXAM CHEST 1 VIEW: CPT

## 2021-09-06 PROCEDURE — 86923 COMPATIBILITY TEST ELECTRIC: CPT

## 2021-09-06 PROCEDURE — 6360000002 HC RX W HCPCS: Performed by: PHYSICIAN ASSISTANT

## 2021-09-06 PROCEDURE — 86900 BLOOD TYPING SEROLOGIC ABO: CPT

## 2021-09-06 PROCEDURE — 72125 CT NECK SPINE W/O DYE: CPT

## 2021-09-06 PROCEDURE — 80053 COMPREHEN METABOLIC PANEL: CPT

## 2021-09-06 PROCEDURE — 85610 PROTHROMBIN TIME: CPT

## 2021-09-06 PROCEDURE — 96374 THER/PROPH/DIAG INJ IV PUSH: CPT

## 2021-09-06 RX ORDER — POLYETHYLENE GLYCOL 3350 17 G/17G
12 POWDER, FOR SOLUTION ORAL DAILY
Status: DISCONTINUED | OUTPATIENT
Start: 2021-09-06 | End: 2021-09-11 | Stop reason: HOSPADM

## 2021-09-06 RX ORDER — CHOLECALCIFEROL (VITAMIN D3) 125 MCG
500 CAPSULE ORAL DAILY
Status: DISCONTINUED | OUTPATIENT
Start: 2021-09-06 | End: 2021-09-11 | Stop reason: HOSPADM

## 2021-09-06 RX ORDER — LEVOTHYROXINE SODIUM 0.1 MG/1
100 TABLET ORAL DAILY
Status: DISCONTINUED | OUTPATIENT
Start: 2021-09-07 | End: 2021-09-11 | Stop reason: HOSPADM

## 2021-09-06 RX ORDER — LISINOPRIL 10 MG/1
10 TABLET ORAL DAILY
COMMUNITY

## 2021-09-06 RX ORDER — SENNA PLUS 8.6 MG/1
1 TABLET ORAL DAILY PRN
Status: DISCONTINUED | OUTPATIENT
Start: 2021-09-06 | End: 2021-09-11 | Stop reason: HOSPADM

## 2021-09-06 RX ORDER — BISACODYL 10 MG
10 SUPPOSITORY, RECTAL RECTAL DAILY PRN
COMMUNITY

## 2021-09-06 RX ORDER — SODIUM CHLORIDE, SODIUM LACTATE, POTASSIUM CHLORIDE, AND CALCIUM CHLORIDE .6; .31; .03; .02 G/100ML; G/100ML; G/100ML; G/100ML
500 INJECTION, SOLUTION INTRAVENOUS ONCE
Status: COMPLETED | OUTPATIENT
Start: 2021-09-06 | End: 2021-09-07

## 2021-09-06 RX ORDER — SODIUM CHLORIDE 9 MG/ML
INJECTION, SOLUTION INTRAVENOUS CONTINUOUS
Status: DISCONTINUED | OUTPATIENT
Start: 2021-09-07 | End: 2021-09-08

## 2021-09-06 RX ORDER — ETOMIDATE 2 MG/ML
10 INJECTION INTRAVENOUS ONCE
Status: COMPLETED | OUTPATIENT
Start: 2021-09-06 | End: 2021-09-06

## 2021-09-06 RX ORDER — LATANOPROST 50 UG/ML
1 SOLUTION/ DROPS OPHTHALMIC DAILY
Status: DISCONTINUED | OUTPATIENT
Start: 2021-09-06 | End: 2021-09-11 | Stop reason: HOSPADM

## 2021-09-06 RX ORDER — OMEGA-3S/DHA/EPA/FISH OIL/D3 300MG-1000
400 CAPSULE ORAL DAILY
COMMUNITY

## 2021-09-06 RX ORDER — ONDANSETRON 4 MG/1
4 TABLET, ORALLY DISINTEGRATING ORAL EVERY 8 HOURS PRN
Status: DISCONTINUED | OUTPATIENT
Start: 2021-09-06 | End: 2021-09-11 | Stop reason: HOSPADM

## 2021-09-06 RX ORDER — LABETALOL 200 MG/1
200 TABLET, FILM COATED ORAL 2 TIMES DAILY
Status: DISCONTINUED | OUTPATIENT
Start: 2021-09-06 | End: 2021-09-11 | Stop reason: HOSPADM

## 2021-09-06 RX ORDER — ONDANSETRON 2 MG/ML
4 INJECTION INTRAMUSCULAR; INTRAVENOUS EVERY 6 HOURS PRN
Status: DISCONTINUED | OUTPATIENT
Start: 2021-09-06 | End: 2021-09-11 | Stop reason: HOSPADM

## 2021-09-06 RX ORDER — MORPHINE SULFATE 2 MG/ML
2 INJECTION, SOLUTION INTRAMUSCULAR; INTRAVENOUS
Status: DISCONTINUED | OUTPATIENT
Start: 2021-09-06 | End: 2021-09-11 | Stop reason: HOSPADM

## 2021-09-06 RX ORDER — ONDANSETRON 2 MG/ML
4 INJECTION INTRAMUSCULAR; INTRAVENOUS ONCE
Status: COMPLETED | OUTPATIENT
Start: 2021-09-06 | End: 2021-09-06

## 2021-09-06 RX ORDER — ATORVASTATIN CALCIUM 10 MG/1
10 TABLET, FILM COATED ORAL DAILY
Status: DISCONTINUED | OUTPATIENT
Start: 2021-09-06 | End: 2021-09-11 | Stop reason: HOSPADM

## 2021-09-06 RX ORDER — DORZOLAMIDE HCL 20 MG/ML
1 SOLUTION/ DROPS OPHTHALMIC 2 TIMES DAILY
Status: DISCONTINUED | OUTPATIENT
Start: 2021-09-06 | End: 2021-09-11 | Stop reason: HOSPADM

## 2021-09-06 RX ORDER — TOLTERODINE 2 MG/1
2 CAPSULE, EXTENDED RELEASE ORAL DAILY
COMMUNITY

## 2021-09-06 RX ORDER — 0.9 % SODIUM CHLORIDE 0.9 %
1000 INTRAVENOUS SOLUTION INTRAVENOUS ONCE
Status: COMPLETED | OUTPATIENT
Start: 2021-09-06 | End: 2021-09-06

## 2021-09-06 RX ORDER — SODIUM CHLORIDE, SODIUM LACTATE, POTASSIUM CHLORIDE, AND CALCIUM CHLORIDE .6; .31; .03; .02 G/100ML; G/100ML; G/100ML; G/100ML
500 INJECTION, SOLUTION INTRAVENOUS ONCE
Status: COMPLETED | OUTPATIENT
Start: 2021-09-06 | End: 2021-09-06

## 2021-09-06 RX ADMIN — ATORVASTATIN CALCIUM 10 MG: 10 TABLET, FILM COATED ORAL at 20:33

## 2021-09-06 RX ADMIN — SODIUM CHLORIDE, POTASSIUM CHLORIDE, SODIUM LACTATE AND CALCIUM CHLORIDE 500 ML: 600; 310; 30; 20 INJECTION, SOLUTION INTRAVENOUS at 20:33

## 2021-09-06 RX ADMIN — SODIUM CHLORIDE 1000 ML: 9 INJECTION, SOLUTION INTRAVENOUS at 17:19

## 2021-09-06 RX ADMIN — ONDANSETRON 4 MG: 2 INJECTION INTRAMUSCULAR; INTRAVENOUS at 15:01

## 2021-09-06 RX ADMIN — LATANOPROST 1 DROP: 50 SOLUTION OPHTHALMIC at 20:33

## 2021-09-06 RX ADMIN — CYANOCOBALAMIN TAB 500 MCG 500 MCG: 500 TAB at 20:33

## 2021-09-06 RX ADMIN — POLYETHYLENE GLYCOL 3350 12 G: 17 POWDER, FOR SOLUTION ORAL at 20:33

## 2021-09-06 RX ADMIN — ETOMIDATE 10 MG: 20 INJECTION, SOLUTION INTRAVENOUS at 14:41

## 2021-09-06 RX ADMIN — SODIUM CHLORIDE, POTASSIUM CHLORIDE, SODIUM LACTATE AND CALCIUM CHLORIDE 500 ML: 600; 310; 30; 20 INJECTION, SOLUTION INTRAVENOUS at 22:39

## 2021-09-06 ASSESSMENT — PAIN SCALES - GENERAL
PAINLEVEL_OUTOF10: 10
PAINLEVEL_OUTOF10: 10
PAINLEVEL_OUTOF10: 2

## 2021-09-06 ASSESSMENT — PAIN DESCRIPTION - PAIN TYPE
TYPE: ACUTE PAIN
TYPE: ACUTE PAIN

## 2021-09-06 ASSESSMENT — PAIN DESCRIPTION - ORIENTATION
ORIENTATION: LEFT
ORIENTATION: LEFT

## 2021-09-06 ASSESSMENT — PAIN DESCRIPTION - LOCATION
LOCATION: HIP
LOCATION: HIP

## 2021-09-06 ASSESSMENT — PAIN DESCRIPTION - DESCRIPTORS: DESCRIPTORS: SHARP

## 2021-09-06 ASSESSMENT — PAIN DESCRIPTION - ONSET: ONSET: SUDDEN

## 2021-09-06 ASSESSMENT — PAIN DESCRIPTION - FREQUENCY: FREQUENCY: CONTINUOUS

## 2021-09-06 NOTE — ED PROVIDER NOTES
[address] [facility] ED  eMERGENCY dEPARTMENT eNCOUnter      Pt Name: Tanisha Tafoya  MRN: [id number]  Lubagfirma 12/4/1928  Date of evaluation: 9/6/2021  Provider: DENA Henriquez        HISTORY OF PRESENT ILLNESS    Tanisha Tafoya is a 80 y.o. female per chart review has a h/o congenital hypothyroidism, right hip fracture requiring operative repair, presents to the emergency department from her skilled nursing facility after reported mechanical fall from standing while attempting to ambulate with her walker just prior to arrival.  Unclear history of head trauma, no reported loss of consciousness or use of chronic anticoagulation. Patient with left hip pain and deformity upon EMS arrival, multiple episodes of emesis after prior fentanyl administration. Patient denies chest pain, shortness of breath, abdominal pain. Denies secondary skeletal complaints or numbness. Is AO x2, reportedly at her baseline per SNF report. Unable to tell me when she last ate or drank. REVIEW OF SYSTEMS       Review of Systems   Unable to perform ROS: Dementia       Except as noted above the remainder of the review of systems was reviewed and negative. PAST MEDICAL HISTORY   No past medical history on file.       SURGICAL HISTORY       Past Surgical History:   Procedure Laterality Date    HIP FRACTURE SURGERY Right 9/5/2019    HIP OPEN REDUCTION INTERNAL FIXATION performed by Radha Fong MD at [facility] 8       Previous Medications    ASPIRIN 81 MG EC TABLET    Take 1 tablet by mouth 2 times daily    ATORVASTATIN (LIPITOR) 10 MG TABLET    Take 10 mg by mouth daily    CYANOCOBALAMIN 500 MCG TABLET    Take 500 mcg by mouth daily    DORZOLAMIDE (TRUSOPT) 2 % OPHTHALMIC SOLUTION    Place 1 drop into the left eye 2 times daily    LABETALOL (NORMODYNE) 100 MG TABLET    Take 200 mg by mouth 2 times daily     LATANOPROST (XALATAN) 0.005 % OPHTHALMIC SOLUTION    Place 1 drop into the left eye daily    LEVOTHYROXINE (SYNTHROID) 100 MCG TABLET    Take 100 mcg by mouth Daily    MULTIPLE MINERALS-VITAMINS PO    Take 1 tablet by mouth daily    POLYETHYLENE GLYCOL (GLYCOLAX) PACKET    Take 12 g by mouth daily        ALLERGIES     Ibuprofen, Naprosyn [naproxen], Plaquenil [hydroxychloroquine sulfate], and Tylenol [acetaminophen]    FAMILY HISTORY     No family history on file. SOCIAL HISTORY       Social History     Socioeconomic History    Marital status:      Spouse name: Not on file    Number of children: Not on file    Years of education: Not on file    Highest education level: Not on file   Occupational History    Not on file   Tobacco Use    Smoking status: Never Smoker    Smokeless tobacco: Never Used   Vaping Use    Vaping Use: Never used   Substance and Sexual Activity    Alcohol use: Not on file    Drug use: Not on file    Sexual activity: Not on file   Other Topics Concern    Not on file   Social History Narrative    Not on file     Social Determinants of Health     Financial Resource Strain:     Difficulty of Paying Living Expenses:    Food Insecurity:     Worried About Running Out of Food in the Last Year:     [address] in the Last Year:    Transportation Needs:     Lack of Transportation (Medical):      Lack of Transportation (Non-Medical):    Physical Activity:     Days of Exercise per Week:     Minutes of Exercise per Session:    Stress:     Feeling of Stress :    Social Connections:     Frequency of Communication with Friends and Family:     Frequency of Social Gatherings with Friends and Family:     Attends [employer] Services:     Active Member of Clubs or Organizations:     Attends Club or Organization Meetings:     Marital Status:    Intimate Partner Violence:     Fear of Current or Ex-Partner:     Emotionally Abused:     Physically Abused:     Sexually Abused:          PHYSICAL EXAM        ED Triage Vitals [09/06/21 1152]   BP Temp Temp Source Pulse Resp SpO2 Height Weight   (!) 198/64 97.8 °F (36.6 °C) Oral 62 17 94 % 5' (1.524 m) 115 lb (52.2 kg)       Physical Exam  Vitals and nursing note reviewed. Constitutional:       General: She is not in acute distress. Appearance: Normal appearance. She is normal weight. She is not ill-appearing, toxic-appearing or diaphoretic. HENT:      Head: Normocephalic and atraumatic. Right Ear: Tympanic membrane normal.      Left Ear: Tympanic membrane normal.      Nose: Nose normal. No congestion. Mouth/Throat:      Mouth: Mucous membranes are moist.      Pharynx: Oropharynx is clear. No oropharyngeal exudate. Eyes:      Extraocular Movements: Extraocular movements intact. Pupils: Pupils are equal, round, and reactive to light. Neck:      Comments: Cervical spine with hard EMS collar applied, no midline tenderness to palpation. Cardiovascular:      Rate and Rhythm: Normal rate and regular rhythm. Pulses: Normal pulses. Heart sounds: Normal heart sounds. No murmur heard. Pulmonary:      Effort: Pulmonary effort is normal. No respiratory distress. Breath sounds: Normal breath sounds. No wheezing. Comments: Breathing comfortably on room air without anterior chest wall tenderness to palpation. Abdominal:      General: Abdomen is flat. Bowel sounds are normal. There is no distension. Palpations: Abdomen is soft. Tenderness: There is no abdominal tenderness. There is no guarding. Musculoskeletal:         General: No swelling, tenderness, deformity or signs of injury. Normal range of motion. Cervical back: Normal range of motion and neck supple. No rigidity. No muscular tenderness. Comments: Marked deformity of L distal femur with left-sided angulation, no overlying skin changes or breakdown. No visible poke holes. No skin tenting. Neurovascularly intact with palpable DP pulse, moves all extremities and wiggles toes to command.   Reported intact and symmetric sensation to bilateral lower extremities. No secondary skeletal pain elicited on secondary exam.   Lymphadenopathy:      Cervical: No cervical adenopathy. Skin:     General: Skin is warm and dry. Capillary Refill: Capillary refill takes less than 2 seconds. Neurological:      General: No focal deficit present. Mental Status: She is alert. Mental status is at baseline. Motor: No weakness. Gait: Gait normal.      Comments: Alert and oriented to name and place. Baseline per SNF report. Psychiatric:         Mood and Affect: Mood normal.         Behavior: Behavior normal.         Thought Content: Thought content normal.         Judgment: Judgment normal.           LABS:  Labs Reviewed   COMPREHENSIVE METABOLIC PANEL - Abnormal; Notable for the following components:       Result Value    Sodium 129 (*)     Chloride 92 (*)     Anion Gap 8 (*)     Glucose 106 (*)     Total Protein 5.6 (*)     Globulin 2.0 (*)     All other components within normal limits   CBC - Abnormal; Notable for the following components:    WBC 4.1 (*)     RBC 3.45 (*)     Hemoglobin 11.6 (*)     Hematocrit 33.9 (*)     MCH 33.5 (*)     All other components within normal limits   APTT   PROTIME-INR   TYPE AND SCREEN         MDM:   Vitals:    Vitals:    09/06/21 1457 09/06/21 1458 09/06/21 1459 09/06/21 1500   BP:    (!) 179/84   Pulse: 90 79 79 79   Resp: 19 18 12 10   Temp:       TempSrc:       SpO2: 100%   100%   Weight:       Height:           80year old female presents to the ED with c/o fall, L hip pain. Triage records were reviewed. Medical records were reviewed. Nursing notes were reviewed and incorporated. Patient afebrile, hemodynamically stable, non-toxic in appearance upon initial evaluation. Labs/Imaging:   Labs reviewed, remarkable for mild hyponatremia (129, appears baseline), coags and T&S obtained.  Preoperative EKG below:   EKG shows NSR with HR 61, normal axis, normal intervals, no ST changes. 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 CT brain/cspine negative. XR showing displaced distal femur fracture, discussed with orthopedics who notes they will admit as primary and place their own orders on admission (advised the RN on the floor to call ortho for admission orders). Reduction performed by Dr. Rimma Dickerson who ordered conscious sedation in the ED at ortho request, with CT femur following reduction and knee immobilizer per ortho instructions. I attempted to call emergency contact, without answer, no identification on voicemail. Of note, patient is a DNR-CC, presents with paperwork. Patient admitted to orthopedic service for further plan/possible operative determination. CRITICAL CARE TIME   Total CriticalCare time was 0 minutes, excluding separately reportable procedures. There was a high probability of clinically significant/life threatening deterioration in the patient's condition which required my urgent intervention. PROCEDURES:  Unlessotherwise noted below, none      Procedures      FINAL IMPRESSION      1. Sarahy-prosthetic fracture of shaft of femur    2. Fall from standing, initial encounter    3. Hyponatremia    4.  DNR (do not resuscitate)          DISPOSITION/PLAN   DISPOSITION Decision To Admit 09/06/2021 02:08:15 PM          DENA Moore (electronically signed)  Attending Emergency Physician          DENA Moore  09/06/21 [facility]  09/06/21 0474

## 2021-09-07 ENCOUNTER — ANESTHESIA (OUTPATIENT)
Dept: OPERATING ROOM | Age: 86
DRG: 481 | End: 2021-09-07
Payer: MEDICARE

## 2021-09-07 ENCOUNTER — APPOINTMENT (OUTPATIENT)
Dept: GENERAL RADIOLOGY | Age: 86
DRG: 481 | End: 2021-09-07
Payer: MEDICARE

## 2021-09-07 ENCOUNTER — ANESTHESIA EVENT (OUTPATIENT)
Dept: OPERATING ROOM | Age: 86
DRG: 481 | End: 2021-09-07
Payer: MEDICARE

## 2021-09-07 VITALS — DIASTOLIC BLOOD PRESSURE: 65 MMHG | OXYGEN SATURATION: 98 % | TEMPERATURE: 69.1 F | SYSTOLIC BLOOD PRESSURE: 140 MMHG

## 2021-09-07 LAB
ANION GAP SERPL CALCULATED.3IONS-SCNC: 9 MEQ/L (ref 9–15)
BUN BLDV-MCNC: 23 MG/DL (ref 8–23)
CALCIUM SERPL-MCNC: 8.4 MG/DL (ref 8.5–9.9)
CHLORIDE BLD-SCNC: 98 MEQ/L (ref 95–107)
CO2: 24 MEQ/L (ref 20–31)
CREAT SERPL-MCNC: 0.68 MG/DL (ref 0.5–0.9)
GFR AFRICAN AMERICAN: >60
GFR NON-AFRICAN AMERICAN: >60
GLUCOSE BLD-MCNC: 131 MG/DL (ref 70–99)
HCT VFR BLD CALC: 23.8 % (ref 37–47)
HCT VFR BLD CALC: 24.2 % (ref 37–47)
HEMOGLOBIN: 8.4 G/DL (ref 12–16)
HEMOGLOBIN: 8.5 G/DL (ref 12–16)
MCH RBC QN AUTO: 34.1 PG (ref 27–31.3)
MCH RBC QN AUTO: 34.1 PG (ref 27–31.3)
MCHC RBC AUTO-ENTMCNC: 35.1 % (ref 33–37)
MCHC RBC AUTO-ENTMCNC: 35.2 % (ref 33–37)
MCV RBC AUTO: 96.7 FL (ref 82–100)
MCV RBC AUTO: 97 FL (ref 82–100)
PDW BLD-RTO: 13.4 % (ref 11.5–14.5)
PDW BLD-RTO: 13.6 % (ref 11.5–14.5)
PLATELET # BLD: 177 K/UL (ref 130–400)
PLATELET # BLD: 188 K/UL (ref 130–400)
POTASSIUM REFLEX MAGNESIUM: 4.8 MEQ/L (ref 3.4–4.9)
RBC # BLD: 2.46 M/UL (ref 4.2–5.4)
RBC # BLD: 2.49 M/UL (ref 4.2–5.4)
SODIUM BLD-SCNC: 131 MEQ/L (ref 135–144)
WBC # BLD: 6.3 K/UL (ref 4.8–10.8)
WBC # BLD: 6.5 K/UL (ref 4.8–10.8)

## 2021-09-07 PROCEDURE — P9047 ALBUMIN (HUMAN), 25%, 50ML: HCPCS | Performed by: ANESTHESIOLOGY

## 2021-09-07 PROCEDURE — 80048 BASIC METABOLIC PNL TOTAL CA: CPT

## 2021-09-07 PROCEDURE — 0QSC04Z REPOSITION LEFT LOWER FEMUR WITH INTERNAL FIXATION DEVICE, OPEN APPROACH: ICD-10-PCS | Performed by: ORTHOPAEDIC SURGERY

## 2021-09-07 PROCEDURE — 3600000004 HC SURGERY LEVEL 4 BASE: Performed by: ORTHOPAEDIC SURGERY

## 2021-09-07 PROCEDURE — 3700000000 HC ANESTHESIA ATTENDED CARE: Performed by: ORTHOPAEDIC SURGERY

## 2021-09-07 PROCEDURE — 3600000014 HC SURGERY LEVEL 4 ADDTL 15MIN: Performed by: ORTHOPAEDIC SURGERY

## 2021-09-07 PROCEDURE — 7100000000 HC PACU RECOVERY - FIRST 15 MIN: Performed by: ORTHOPAEDIC SURGERY

## 2021-09-07 PROCEDURE — 2580000003 HC RX 258: Performed by: ORTHOPAEDIC SURGERY

## 2021-09-07 PROCEDURE — 7100000001 HC PACU RECOVERY - ADDTL 15 MIN: Performed by: ORTHOPAEDIC SURGERY

## 2021-09-07 PROCEDURE — 3209999900 FLUORO FOR SURGICAL PROCEDURES

## 2021-09-07 PROCEDURE — 2580000003 HC RX 258: Performed by: NURSE PRACTITIONER

## 2021-09-07 PROCEDURE — 6370000000 HC RX 637 (ALT 250 FOR IP): Performed by: PHYSICIAN ASSISTANT

## 2021-09-07 PROCEDURE — 3700000001 HC ADD 15 MINUTES (ANESTHESIA): Performed by: ORTHOPAEDIC SURGERY

## 2021-09-07 PROCEDURE — 64445 NJX AA&/STRD SCIATIC NRV IMG: CPT | Performed by: ANESTHESIOLOGY

## 2021-09-07 PROCEDURE — 36415 COLL VENOUS BLD VENIPUNCTURE: CPT

## 2021-09-07 PROCEDURE — 2500000003 HC RX 250 WO HCPCS: Performed by: ANESTHESIOLOGY

## 2021-09-07 PROCEDURE — 6370000000 HC RX 637 (ALT 250 FOR IP): Performed by: INTERNAL MEDICINE

## 2021-09-07 PROCEDURE — 3E0T3BZ INTRODUCTION OF ANESTHETIC AGENT INTO PERIPHERAL NERVES AND PLEXI, PERCUTANEOUS APPROACH: ICD-10-PCS | Performed by: ANESTHESIOLOGY

## 2021-09-07 PROCEDURE — 64447 NJX AA&/STRD FEMORAL NRV IMG: CPT | Performed by: ANESTHESIOLOGY

## 2021-09-07 PROCEDURE — 6360000002 HC RX W HCPCS: Performed by: ANESTHESIOLOGY

## 2021-09-07 PROCEDURE — C1776 JOINT DEVICE (IMPLANTABLE): HCPCS | Performed by: ORTHOPAEDIC SURGERY

## 2021-09-07 PROCEDURE — 2709999900 HC NON-CHARGEABLE SUPPLY: Performed by: ORTHOPAEDIC SURGERY

## 2021-09-07 PROCEDURE — 6360000002 HC RX W HCPCS: Performed by: ORTHOPAEDIC SURGERY

## 2021-09-07 PROCEDURE — 85027 COMPLETE CBC AUTOMATED: CPT

## 2021-09-07 PROCEDURE — 2580000003 HC RX 258: Performed by: ANESTHESIOLOGY

## 2021-09-07 PROCEDURE — C1713 ANCHOR/SCREW BN/BN,TIS/BN: HCPCS | Performed by: ORTHOPAEDIC SURGERY

## 2021-09-07 PROCEDURE — 6370000000 HC RX 637 (ALT 250 FOR IP): Performed by: ORTHOPAEDIC SURGERY

## 2021-09-07 PROCEDURE — 1210000000 HC MED SURG R&B

## 2021-09-07 PROCEDURE — 2720000010 HC SURG SUPPLY STERILE: Performed by: ORTHOPAEDIC SURGERY

## 2021-09-07 PROCEDURE — 94150 VITAL CAPACITY TEST: CPT

## 2021-09-07 DEVICE — SCREW BNE L52MM DIA4.5MM PROX CORT TIB S STL ST LOK FULL: Type: IMPLANTABLE DEVICE | Site: FEMUR | Status: FUNCTIONAL

## 2021-09-07 DEVICE — SCREW BNE L38MM DIA4.5MM PROX CORT TIB S STL ST LOK FULL: Type: IMPLANTABLE DEVICE | Site: FEMUR | Status: FUNCTIONAL

## 2021-09-07 DEVICE — SCREW BNE L24MM DIA4.5MM PROX CORT TIB S STL ST LOK FULL: Type: IMPLANTABLE DEVICE | Site: FEMUR | Status: FUNCTIONAL

## 2021-09-07 DEVICE — SCREW BNE L36MM DIA4.5MM PROX CORT TIB S STL ST LOK FULL: Type: IMPLANTABLE DEVICE | Site: FEMUR | Status: FUNCTIONAL

## 2021-09-07 DEVICE — CABLE SURG DIA1.7MM S STL HA CERCLAGE W/ CRMP 29880101S] DEPUY SYNTHES USA]: Type: IMPLANTABLE DEVICE | Site: FEMUR | Status: FUNCTIONAL

## 2021-09-07 DEVICE — SCREW BNE L40MM DIA5MM S STL ST LOK FULL THRD T25 STARDRV: Type: IMPLANTABLE DEVICE | Site: FEMUR | Status: FUNCTIONAL

## 2021-09-07 DEVICE — SCREW BNE L34MM DIA5MM S STL ST LOK FULL THRD T25 STARDRV: Type: IMPLANTABLE DEVICE | Site: FEMUR | Status: FUNCTIONAL

## 2021-09-07 DEVICE — SCREW BNE L65MM DIA5MM S STL ST LOK FULL THRD T25 STARDRV: Type: IMPLANTABLE DEVICE | Site: FEMUR | Status: FUNCTIONAL

## 2021-09-07 DEVICE — IMPLANTABLE DEVICE: Type: IMPLANTABLE DEVICE | Site: FEMUR | Status: FUNCTIONAL

## 2021-09-07 DEVICE — SCREW BNE L70MM DIA5MM S STL ST LOK FULL THRD T25 STARDRV: Type: IMPLANTABLE DEVICE | Site: FEMUR | Status: FUNCTIONAL

## 2021-09-07 RX ORDER — SODIUM CHLORIDE, SODIUM LACTATE, POTASSIUM CHLORIDE, CALCIUM CHLORIDE 600; 310; 30; 20 MG/100ML; MG/100ML; MG/100ML; MG/100ML
INJECTION, SOLUTION INTRAVENOUS CONTINUOUS PRN
Status: DISCONTINUED | OUTPATIENT
Start: 2021-09-07 | End: 2021-09-07 | Stop reason: SDUPTHER

## 2021-09-07 RX ORDER — MAGNESIUM HYDROXIDE/ALUMINUM HYDROXICE/SIMETHICONE 120; 1200; 1200 MG/30ML; MG/30ML; MG/30ML
15 SUSPENSION ORAL EVERY 6 HOURS PRN
Status: DISCONTINUED | OUTPATIENT
Start: 2021-09-07 | End: 2021-09-11 | Stop reason: HOSPADM

## 2021-09-07 RX ORDER — SENNA AND DOCUSATE SODIUM 50; 8.6 MG/1; MG/1
1 TABLET, FILM COATED ORAL 2 TIMES DAILY
Status: DISCONTINUED | OUTPATIENT
Start: 2021-09-07 | End: 2021-09-11 | Stop reason: HOSPADM

## 2021-09-07 RX ORDER — ALBUMIN (HUMAN) 12.5 G/50ML
SOLUTION INTRAVENOUS PRN
Status: DISCONTINUED | OUTPATIENT
Start: 2021-09-07 | End: 2021-09-07

## 2021-09-07 RX ORDER — DIPHENHYDRAMINE HYDROCHLORIDE 50 MG/ML
12.5 INJECTION INTRAMUSCULAR; INTRAVENOUS
Status: DISCONTINUED | OUTPATIENT
Start: 2021-09-07 | End: 2021-09-07

## 2021-09-07 RX ORDER — FENTANYL CITRATE 50 UG/ML
50 INJECTION, SOLUTION INTRAMUSCULAR; INTRAVENOUS EVERY 10 MIN PRN
Status: DISCONTINUED | OUTPATIENT
Start: 2021-09-07 | End: 2021-09-07

## 2021-09-07 RX ORDER — ACETAMINOPHEN 325 MG/1
650 TABLET ORAL EVERY 4 HOURS PRN
Status: DISCONTINUED | OUTPATIENT
Start: 2021-09-07 | End: 2021-09-07

## 2021-09-07 RX ORDER — ONDANSETRON 2 MG/ML
4 INJECTION INTRAMUSCULAR; INTRAVENOUS EVERY 6 HOURS PRN
Status: DISCONTINUED | OUTPATIENT
Start: 2021-09-07 | End: 2021-09-11 | Stop reason: HOSPADM

## 2021-09-07 RX ORDER — 0.9 % SODIUM CHLORIDE 0.9 %
250 INTRAVENOUS SOLUTION INTRAVENOUS ONCE
Status: COMPLETED | OUTPATIENT
Start: 2021-09-07 | End: 2021-09-07

## 2021-09-07 RX ORDER — MEPERIDINE HYDROCHLORIDE 25 MG/ML
12.5 INJECTION INTRAMUSCULAR; INTRAVENOUS; SUBCUTANEOUS EVERY 5 MIN PRN
Status: DISCONTINUED | OUTPATIENT
Start: 2021-09-07 | End: 2021-09-07

## 2021-09-07 RX ORDER — PROPOFOL 10 MG/ML
INJECTION, EMULSION INTRAVENOUS PRN
Status: DISCONTINUED | OUTPATIENT
Start: 2021-09-07 | End: 2021-09-07 | Stop reason: SDUPTHER

## 2021-09-07 RX ORDER — METOCLOPRAMIDE HYDROCHLORIDE 5 MG/ML
10 INJECTION INTRAMUSCULAR; INTRAVENOUS
Status: DISCONTINUED | OUTPATIENT
Start: 2021-09-07 | End: 2021-09-07

## 2021-09-07 RX ORDER — OMEGA-3S/DHA/EPA/FISH OIL/D3 300MG-1000
400 CAPSULE ORAL DAILY
Status: DISCONTINUED | OUTPATIENT
Start: 2021-09-07 | End: 2021-09-11 | Stop reason: HOSPADM

## 2021-09-07 RX ORDER — DEXAMETHASONE SODIUM PHOSPHATE 10 MG/ML
INJECTION INTRAMUSCULAR; INTRAVENOUS PRN
Status: DISCONTINUED | OUTPATIENT
Start: 2021-09-07 | End: 2021-09-07 | Stop reason: SDUPTHER

## 2021-09-07 RX ORDER — ONDANSETRON 2 MG/ML
4 INJECTION INTRAMUSCULAR; INTRAVENOUS
Status: DISCONTINUED | OUTPATIENT
Start: 2021-09-07 | End: 2021-09-07

## 2021-09-07 RX ORDER — ROCURONIUM BROMIDE 10 MG/ML
INJECTION, SOLUTION INTRAVENOUS PRN
Status: DISCONTINUED | OUTPATIENT
Start: 2021-09-07 | End: 2021-09-07 | Stop reason: SDUPTHER

## 2021-09-07 RX ORDER — MAGNESIUM HYDROXIDE 1200 MG/15ML
LIQUID ORAL CONTINUOUS PRN
Status: COMPLETED | OUTPATIENT
Start: 2021-09-07 | End: 2021-09-07

## 2021-09-07 RX ORDER — SODIUM CHLORIDE, SODIUM LACTATE, POTASSIUM CHLORIDE, CALCIUM CHLORIDE 600; 310; 30; 20 MG/100ML; MG/100ML; MG/100ML; MG/100ML
INJECTION, SOLUTION INTRAVENOUS CONTINUOUS
Status: DISPENSED | OUTPATIENT
Start: 2021-09-07 | End: 2021-09-08

## 2021-09-07 RX ORDER — ALBUMIN (HUMAN) 12.5 G/50ML
SOLUTION INTRAVENOUS PRN
Status: DISCONTINUED | OUTPATIENT
Start: 2021-09-07 | End: 2021-09-07 | Stop reason: SDUPTHER

## 2021-09-07 RX ORDER — EPINEPHRINE 1 MG/ML
INJECTION, SOLUTION, CONCENTRATE INTRAVENOUS PRN
Status: DISCONTINUED | OUTPATIENT
Start: 2021-09-07 | End: 2021-09-07 | Stop reason: SDUPTHER

## 2021-09-07 RX ORDER — SODIUM CHLORIDE 0.9 % (FLUSH) 0.9 %
10 SYRINGE (ML) INJECTION EVERY 12 HOURS SCHEDULED
Status: DISCONTINUED | OUTPATIENT
Start: 2021-09-07 | End: 2021-09-11 | Stop reason: HOSPADM

## 2021-09-07 RX ORDER — SODIUM CHLORIDE 0.9 % (FLUSH) 0.9 %
10 SYRINGE (ML) INJECTION PRN
Status: DISCONTINUED | OUTPATIENT
Start: 2021-09-07 | End: 2021-09-11 | Stop reason: HOSPADM

## 2021-09-07 RX ORDER — SODIUM CHLORIDE 9 MG/ML
25 INJECTION, SOLUTION INTRAVENOUS PRN
Status: DISCONTINUED | OUTPATIENT
Start: 2021-09-07 | End: 2021-09-11 | Stop reason: HOSPADM

## 2021-09-07 RX ORDER — OXYCODONE HYDROCHLORIDE 5 MG/1
5 TABLET ORAL EVERY 4 HOURS PRN
Status: DISCONTINUED | OUTPATIENT
Start: 2021-09-07 | End: 2021-09-11 | Stop reason: HOSPADM

## 2021-09-07 RX ORDER — CEFAZOLIN SODIUM 1 G/50ML
INJECTION, SOLUTION INTRAVENOUS PRN
Status: DISCONTINUED | OUTPATIENT
Start: 2021-09-07 | End: 2021-09-07 | Stop reason: SDUPTHER

## 2021-09-07 RX ORDER — EPHEDRINE SULFATE/0.9% NACL/PF 50 MG/5 ML
SYRINGE (ML) INTRAVENOUS PRN
Status: DISCONTINUED | OUTPATIENT
Start: 2021-09-07 | End: 2021-09-07 | Stop reason: SDUPTHER

## 2021-09-07 RX ORDER — ONDANSETRON 4 MG/1
4 TABLET, ORALLY DISINTEGRATING ORAL EVERY 8 HOURS PRN
Status: DISCONTINUED | OUTPATIENT
Start: 2021-09-07 | End: 2021-09-11 | Stop reason: HOSPADM

## 2021-09-07 RX ORDER — ROPIVACAINE HYDROCHLORIDE 5 MG/ML
INJECTION, SOLUTION EPIDURAL; INFILTRATION; PERINEURAL PRN
Status: DISCONTINUED | OUTPATIENT
Start: 2021-09-07 | End: 2021-09-07 | Stop reason: SDUPTHER

## 2021-09-07 RX ORDER — MORPHINE SULFATE 2 MG/ML
2 INJECTION, SOLUTION INTRAMUSCULAR; INTRAVENOUS
Status: DISCONTINUED | OUTPATIENT
Start: 2021-09-07 | End: 2021-09-11 | Stop reason: HOSPADM

## 2021-09-07 RX ORDER — MIDAZOLAM HYDROCHLORIDE 1 MG/ML
INJECTION INTRAMUSCULAR; INTRAVENOUS PRN
Status: DISCONTINUED | OUTPATIENT
Start: 2021-09-07 | End: 2021-09-07 | Stop reason: SDUPTHER

## 2021-09-07 RX ADMIN — ENOXAPARIN SODIUM 40 MG: 40 INJECTION SUBCUTANEOUS at 15:57

## 2021-09-07 RX ADMIN — ALBUMIN (HUMAN) 500 ML: 5 SOLUTION INTRAVENOUS at 12:49

## 2021-09-07 RX ADMIN — EPINEPHRINE 150 MCG: 1 INJECTION, SOLUTION, CONCENTRATE INTRAVENOUS at 11:50

## 2021-09-07 RX ADMIN — Medication 400 UNITS: at 21:22

## 2021-09-07 RX ADMIN — SUGAMMADEX 100 MG: 100 INJECTION, SOLUTION INTRAVENOUS at 14:25

## 2021-09-07 RX ADMIN — Medication 0.2 MG: at 12:27

## 2021-09-07 RX ADMIN — SODIUM CHLORIDE 250 ML: 9 INJECTION, SOLUTION INTRAVENOUS at 10:09

## 2021-09-07 RX ADMIN — LATANOPROST 1 DROP: 50 SOLUTION OPHTHALMIC at 09:40

## 2021-09-07 RX ADMIN — DOCUSATE SODIUM 50 MG AND SENNOSIDES 8.6 MG 1 TABLET: 8.6; 5 TABLET, FILM COATED ORAL at 15:59

## 2021-09-07 RX ADMIN — Medication 20 MG: at 12:36

## 2021-09-07 RX ADMIN — ATORVASTATIN CALCIUM 10 MG: 10 TABLET, FILM COATED ORAL at 09:38

## 2021-09-07 RX ADMIN — Medication 10 MG: at 12:58

## 2021-09-07 RX ADMIN — Medication 0.1 MG: at 12:22

## 2021-09-07 RX ADMIN — ROCURONIUM BROMIDE 50 MG: 10 INJECTION INTRAVENOUS at 12:11

## 2021-09-07 RX ADMIN — MIDAZOLAM HYDROCHLORIDE 2 MG: 2 INJECTION, SOLUTION INTRAMUSCULAR; INTRAVENOUS at 11:45

## 2021-09-07 RX ADMIN — Medication 0.2 MG: at 12:31

## 2021-09-07 RX ADMIN — CEFAZOLIN 2000 MG: 10 INJECTION, POWDER, FOR SOLUTION INTRAVENOUS at 16:30

## 2021-09-07 RX ADMIN — DEXAMETHASONE SODIUM PHOSPHATE 5 MG: 10 INJECTION INTRAMUSCULAR; INTRAVENOUS at 11:56

## 2021-09-07 RX ADMIN — LEVOTHYROXINE SODIUM 100 MCG: 100 TABLET ORAL at 06:58

## 2021-09-07 RX ADMIN — CEFAZOLIN SODIUM 2 G: 1 INJECTION, SOLUTION INTRAVENOUS at 12:22

## 2021-09-07 RX ADMIN — PHENYLEPHRINE HYDROCHLORIDE 100 MCG: 10 INJECTION INTRAVENOUS at 13:45

## 2021-09-07 RX ADMIN — ROPIVACAINE HYDROCHLORIDE 50 MG: 5 INJECTION, SOLUTION EPIDURAL; INFILTRATION; PERINEURAL at 11:56

## 2021-09-07 RX ADMIN — DEXAMETHASONE SODIUM PHOSPHATE 5 MG: 10 INJECTION INTRAMUSCULAR; INTRAVENOUS at 11:50

## 2021-09-07 RX ADMIN — SODIUM CHLORIDE, POTASSIUM CHLORIDE, SODIUM LACTATE AND CALCIUM CHLORIDE: 600; 310; 30; 20 INJECTION, SOLUTION INTRAVENOUS at 11:35

## 2021-09-07 RX ADMIN — DORZOLAMIDE HYDROCHLORIDE 1 DROP: 20 SOLUTION/ DROPS OPHTHALMIC at 21:40

## 2021-09-07 RX ADMIN — CYANOCOBALAMIN TAB 500 MCG 500 MCG: 500 TAB at 09:38

## 2021-09-07 RX ADMIN — PHENYLEPHRINE HYDROCHLORIDE 100 MCG: 10 INJECTION INTRAVENOUS at 13:50

## 2021-09-07 RX ADMIN — DOCUSATE SODIUM 50 MG AND SENNOSIDES 8.6 MG 1 TABLET: 8.6; 5 TABLET, FILM COATED ORAL at 21:40

## 2021-09-07 RX ADMIN — POLYETHYLENE GLYCOL 3350 12 G: 17 POWDER, FOR SOLUTION ORAL at 09:38

## 2021-09-07 RX ADMIN — DORZOLAMIDE HYDROCHLORIDE 1 DROP: 20 SOLUTION/ DROPS OPHTHALMIC at 09:40

## 2021-09-07 RX ADMIN — SODIUM CHLORIDE, POTASSIUM CHLORIDE, SODIUM LACTATE AND CALCIUM CHLORIDE: 600; 310; 30; 20 INJECTION, SOLUTION INTRAVENOUS at 15:56

## 2021-09-07 RX ADMIN — PROPOFOL 80 MG: 10 INJECTION, EMULSION INTRAVENOUS at 12:11

## 2021-09-07 RX ADMIN — SODIUM CHLORIDE, POTASSIUM CHLORIDE, SODIUM LACTATE AND CALCIUM CHLORIDE: 600; 310; 30; 20 INJECTION, SOLUTION INTRAVENOUS at 14:03

## 2021-09-07 RX ADMIN — Medication 20 MG: at 12:52

## 2021-09-07 RX ADMIN — ROPIVACAINE HYDROCHLORIDE 75 MG: 5 INJECTION, SOLUTION EPIDURAL; INFILTRATION; PERINEURAL at 11:50

## 2021-09-07 RX ADMIN — Medication 0.2 MG: at 12:24

## 2021-09-07 ASSESSMENT — PULMONARY FUNCTION TESTS
PIF_VALUE: 15
PIF_VALUE: 15
PIF_VALUE: 14
PIF_VALUE: 19
PIF_VALUE: 15
PIF_VALUE: 14
PIF_VALUE: 15
PIF_VALUE: 13
PIF_VALUE: 15
PIF_VALUE: 0
PIF_VALUE: 15
PIF_VALUE: 14
PIF_VALUE: 15
PIF_VALUE: 2
PIF_VALUE: 15
PIF_VALUE: 14
PIF_VALUE: 15
PIF_VALUE: 1
PIF_VALUE: 15
PIF_VALUE: 15
PIF_VALUE: 14
PIF_VALUE: 0
PIF_VALUE: 15
PIF_VALUE: 15
PIF_VALUE: 16
PIF_VALUE: 14
PIF_VALUE: 15
PIF_VALUE: 1
PIF_VALUE: 15
PIF_VALUE: 14
PIF_VALUE: 15
PIF_VALUE: 14
PIF_VALUE: 13
PIF_VALUE: 15
PIF_VALUE: 14
PIF_VALUE: 15
PIF_VALUE: 15
PIF_VALUE: 14
PIF_VALUE: 14
PIF_VALUE: 15
PIF_VALUE: 14
PIF_VALUE: 17
PIF_VALUE: 14
PIF_VALUE: 14
PIF_VALUE: 15
PIF_VALUE: 15
PIF_VALUE: 14
PIF_VALUE: 15
PIF_VALUE: 1
PIF_VALUE: 15
PIF_VALUE: 1
PIF_VALUE: 1
PIF_VALUE: 15
PIF_VALUE: 1
PIF_VALUE: 15
PIF_VALUE: 1
PIF_VALUE: 14
PIF_VALUE: 14
PIF_VALUE: 15
PIF_VALUE: 12
PIF_VALUE: 15
PIF_VALUE: 15
PIF_VALUE: 13
PIF_VALUE: 15
PIF_VALUE: 15
PIF_VALUE: 14
PIF_VALUE: 2
PIF_VALUE: 19
PIF_VALUE: 15
PIF_VALUE: 14
PIF_VALUE: 0
PIF_VALUE: 15
PIF_VALUE: 0
PIF_VALUE: 15
PIF_VALUE: 1
PIF_VALUE: 15
PIF_VALUE: 21
PIF_VALUE: 6
PIF_VALUE: 15
PIF_VALUE: 15
PIF_VALUE: 14
PIF_VALUE: 15
PIF_VALUE: 14
PIF_VALUE: 15
PIF_VALUE: 4
PIF_VALUE: 15
PIF_VALUE: 14
PIF_VALUE: 15
PIF_VALUE: 14
PIF_VALUE: 15
PIF_VALUE: 15

## 2021-09-07 ASSESSMENT — PAIN SCALES - GENERAL
PAINLEVEL_OUTOF10: 1
PAINLEVEL_OUTOF10: 0
PAINLEVEL_OUTOF10: 1
PAINLEVEL_OUTOF10: 0

## 2021-09-07 ASSESSMENT — PAIN DESCRIPTION - PAIN TYPE
TYPE: ACUTE PAIN
TYPE: ACUTE PAIN

## 2021-09-07 ASSESSMENT — PAIN DESCRIPTION - LOCATION
LOCATION: HIP
LOCATION: HIP

## 2021-09-07 NOTE — CONSULTS
55-year-old female with past medical history of CVA left-sided weakness comes in with fall found to have injured her left hip. On imaging shows mid to distal femoral fracture below the prior DHS and sideplate. Status post surgery. Patient tolerated surgery well. Alert. Mostly disoriented due to anesthesia. Son at bedside. ROS: 12 point review systems negative except was stated in HPI  No past medical history on file. Past Surgical History:   Procedure Laterality Date    HIP FRACTURE SURGERY Right 9/5/2019    HIP OPEN REDUCTION INTERNAL FIXATION performed by Lena Lorenzo MD at [address] [place] [address] History     Socioeconomic History    Marital status:      Spouse name: Not on file    Number of children: Not on file    Years of education: Not on file    Highest education level: Not on file   Occupational History    Not on file   Tobacco Use    Smoking status: Never Smoker    Smokeless tobacco: Never Used   Vaping Use    Vaping Use: Never used   Substance and Sexual Activity    Alcohol use: Not on file    Drug use: Not on file    Sexual activity: Not on file   Other Topics Concern    Not on file   Social History Narrative    Not on file     Social Determinants of Health     Financial Resource Strain:     Difficulty of Paying Living Expenses:    Food Insecurity:     Worried About Running Out of Food in the Last Year:     [address] in the Last Year:    Transportation Needs:     Lack of Transportation (Medical):      Lack of Transportation (Non-Medical):    Physical Activity:     Days of Exercise per Week:     Minutes of Exercise per Session:    Stress:     Feeling of Stress :    Social Connections:     Frequency of Communication with Friends and Family:     Frequency of Social Gatherings with Friends and Family:     Attends Rastafari Services:     Active Member of Clubs or Organizations:     Attends Club or Organization Meetings:     Marital Status:    Intimate Partner Violence:     Fear of Current or Ex-Partner:     Emotionally Abused:     Physically Abused:     Sexually Abused:      No family history on file. No current facility-administered medications on file prior to encounter.      Current Outpatient Medications on File Prior to Encounter   Medication Sig Dispense Refill    lisinopril (PRINIVIL;ZESTRIL) 10 MG tablet Take 10 mg by mouth daily      bisacodyl (DULCOLAX) 10 MG suppository Place 10 mg rectally daily as needed for Constipation      vitamin D3 (CHOLECALCIFEROL) 10 MCG (400 UNIT) TABS tablet Take 400 Units by mouth daily      tolterodine (DETROL LA) 2 MG extended release capsule Take 2 mg by mouth daily      aspirin 325 MG EC tablet Take 325 mg by mouth daily      polyethylene glycol (GLYCOLAX) packet Take 12 g by mouth daily       cyanocobalamin 500 MCG tablet Take 500 mcg by mouth daily      labetalol (NORMODYNE) 100 MG tablet Take 200 mg by mouth 2 times daily       atorvastatin (LIPITOR) 10 MG tablet Take 10 mg by mouth daily      levothyroxine (SYNTHROID) 100 MCG tablet Take 100 mcg by mouth Daily      latanoprost (XALATAN) 0.005 % ophthalmic solution Place 1 drop into the left eye daily       Lab Results   Component Value Date    WBC 6.5 09/07/2021    HGB 8.5 (L) 09/07/2021    HCT 24.2 (L) 09/07/2021    MCV 97.0 09/07/2021     09/07/2021     Lab Results   Component Value Date     09/07/2021    K 4.8 09/07/2021    CL 98 09/07/2021    CO2 24 09/07/2021    BUN 23 09/07/2021    CREATININE 0.68 09/07/2021    GLUCOSE 131 09/07/2021    GLUCOSE 97 05/11/2012    CALCIUM 8.4 09/07/2021    HEENT: AT/NC, PERRLA, no JVD  HEART: s1/s2 wnl w/o s3  LUNG: clear, no wheez  ABD: soft, NT  EXT: no edema  SKin : no rash  Neuro:alert, + weakness  1) encounter for post surgical care  Pain is controlled  2) HTN  Hold nephrotoxic agents  3) H/o CVa  Resume ASA when ok with surgery  4) DVT ppx

## 2021-09-07 NOTE — H&P
Torrie Palomo [address] [place] [address], [address] [facility]                              HISTORY AND PHYSICAL    PATIENT NAME: Omari Yanes                 :        1928  MED REC NO:   [id number]                            ROOM:  ACCOUNT NO:   [de-identified]                           ADMIT DATE: 2021  PROVIDER:     Sharda Jin MD    DATE OF SERVICE:  2021    CHIEF COMPLAINT:  Left leg pain. HISTORY OF PRESENT ILLNESS:  The patient is a 42-year-old female who  fell injuring her left hip. She was found to have a mid to distal  femoral fracture below a prior DHS and a side plate. She is now being  admitted for potential operative stabilization. PAST MEDICAL HISTORY:  Multiple. MEDICATIONS:  Multiple. ALLERGIES:  IBUPROFEN, NAPROXEN, PLAQUENIL, and TYLENOL. Please refer to the intake H and P regarding the patient's review of  systems, family history, and social history as was done today. PHYSICAL EXAMINATION:  HEENT:  Normal.  LUNGS:  Clear. HEART:  Regular. ABDOMEN:  Soft and nontender. SKIN:  Normal.  NEUROLOGICAL:  She is intact. EXTREMITIES:  She has pain on any attempted left leg motion. Prior hip  incision is clear. She is able to move the foot and toes well. No  numbness or tingling noted. No complaints of other extremity trauma. DIAGNOSTIC STUDIES:  Radiographs; AP, lateral, and oblique views of the  left femur show a spiral oblique fracture of the mid and distal femur  extending just below the prior hip plate placement. IMPRESSION:  Displaced left femur fracture. PLAN:  Risks and benefits of operative stabilization were discussed at  length with the patient and family. These do include infection,  stiffness, nerve damage, continued pain as well as need for subsequent  operations. We discussed the difficulty with fixation given her prior  hardware as well as her bone quality.   Regardless, she would benefit  from attempted stabilization and healing. She will need to remain  strictly nonweightbearing initially postoperatively. We will obtain  medical clearance and all questions were answered today with the patient  at the bedside.         Ashley Duncan MD    D: 09/07/2021 10:45:21       T: 09/07/2021 11:32:02     [id number]  Job#: [id number]     Doc#: [id number]    CC:

## 2021-09-07 NOTE — ANESTHESIA PROCEDURE NOTES
Peripheral Block    Patient location during procedure: pre-op  Start time: 9/7/2021 11:45 AM  End time: 9/7/2021 11:50 AM  Staffing  Performed: anesthesiologist   Anesthesiologist: Ankita Victoria MD  Preanesthetic Checklist  Completed: patient identified, IV checked, site marked, risks and benefits discussed, surgical consent, monitors and equipment checked, pre-op evaluation, timeout performed, anesthesia consent given, oxygen available and patient being monitored  Peripheral Block  Patient position: supine  Prep: ChloraPrep  Patient monitoring: cardiac monitor, continuous pulse ox, frequent blood pressure checks and IV access  Block type: Femoral  Laterality: left  Injection technique: single-shot  Guidance: ultrasound guided and IP approach, probe cover employed  Local infiltration: lidocaine  Provider prep: mask and sterile gloves  Local infiltration: lidocaine  Needle  Needle type: combined needle/nerve stimulator   Needle gauge: 21 G  Needle length: 10 cm  Needle localization: ultrasound guidance  Assessment  Injection assessment: negative aspiration for heme, no paresthesia on injection and local visualized surrounding nerve on ultrasound  Paresthesia pain: none  Slow fractionated injection: yes  Hemodynamics: stable  Additional Notes  30cc 0.25% ropivacaine with decadron 5mg and epi 1:200k  Reason for block: post-op pain management

## 2021-09-07 NOTE — PROGRESS NOTES
When assessing pt and obtaining VS. Pt's BP alarmed at 86/38. Pt A&Ox4 talking and alert. Maggy GUERRA Sedar. Administered a 500cc bolus of LR over an hour. Rechecked BP 88/45. Maggy GUERRA Sedar again and administered another 500cc bolus of LR over two hours. Rechecked BP again and 109/47. Pt resting comfortably in bed. Rating pain 2/10. Declined pain medciations at this time saying \"they made me sick in the ER. .im ok. \" Will continue to monitor pt.     Johnathon Elder RN

## 2021-09-07 NOTE — DISCHARGE INSTR - COC
Continuity of Care Form    Patient Name: Tiffany Vega   :  1928  MRN:  [id number]    Admit date:  2021  Discharge date:  ***    Code Status Order: [facility]   Advance Directives:   [place] Directive Type of Healthcare Directive Copy in [address] Agent's Name Healthcare Agent's Phone Number    [address] -       21 1130  Yes, patient has an advance directive for healthcare treatment  Durable power of  for health care  Yes, copy in chart  [facility]            Admitting Physician:  Kitty Henson DO  PCP: Niranjan Anderson MD    Discharging Nurse: [facility] Unit/Room#: R [address] Unit Phone Number: ***    Emergency Contact:   Extended Emergency Contact Information  Primary Emergency Contact: Santy Smyth  Address: [address] [facility] Phone: [phone]  Relation: Spouse  Secondary Emergency Contact: Chandrakant Macedo  Mobile Phone: [phone]  Relation: Child    Past Surgical History:  Past Surgical History:   Procedure Laterality Date    HIP FRACTURE SURGERY Right 2019    HIP OPEN REDUCTION INTERNAL FIXATION performed by Fazal Hollingsworth MD at [facility]       Immunization History:   Immunization History   Administered Date(s) Administered    COVID-19, Salvador Peter, PF, 30mcg/0.3mL 2020, 2021       Active Problems:  Patient Active Problem List   Diagnosis Code    [facility] requiring operative repair, right[facility] S72.001[facility]    Closed displaced comminuted fracture of shaft of left femur with malunion S72.352P       Isolation/Infection:   Isolation            No Isolation          Patient Infection Status       None to display            Nurse Assessment:  Last Vital Signs: BP (!) 107/48   Pulse 100   Temp 97.8 °F (36.6 °C) (Oral)   Resp 18 Facility/ Agency   · Name:   · Address:  · Phone:  · Fax:    Dialysis Facility (if applicable)   · Name:  · Address:  · Dialysis Schedule:  · Phone:  · Fax:    / signature: {Esignature:[id number]:::0}    PHYSICIAN SECTION    Prognosis: Fair    Condition at Discharge: Stable    Rehab Potential (if transferring to Rehab): Good    Recommended Labs or Other Treatments After Discharge:     PT/OT: NWB with No ROM to operative extremity   Knee immobilizer at all times with exception of skin care and hygiene  Follow up with surgeon in two weeks. Physician Certification: I certify the above information and transfer of Tanisha Tafoya  is necessary for the continuing treatment of the diagnosis listed and that she requires [place]  for less 30 days.      Update Admission H&P: No change in H&P    PHYSICIAN SIGNATURE:  Electronically signed by Jesica Cohen MD on 9/7/21 at 12:30 PM EDT

## 2021-09-07 NOTE — CARE COORDINATION
[facility] Case Management Initial Discharge Assessment    Met with Patient to discuss discharge plan. PCP: Omar Gooden MD                                Date of Last Visit: AUG 4    If no PCP, list provided? N/A    Discharge Planning    Living Arrangements: in nursing home-PT [address]    Who do you live with? [facility]    Who helps you with your care:  [facility] AT [address]    If lives at home:     Do you have any barriers navigating in your home? no    Patient can perform ADL? Yes-WITH ASSISTANCE FROM STAFF    Current Services (outpatient and in home) :  None    Dialysis: No    Is transportation available to get to your appointments? Yes    DME Equipment:  yes - ROLLATOR    Respiratory equipment: None    Respiratory provider:  no     Pharmacy:  yes - [facility] with Medication Assistance Program?  No      Patient agreeable to [place] ? N/A    Patient agreeable to SNF/Rehab? Yes, [facility]    Other discharge needs identified? N/A    Does Patient Have a High-Risk for Readmission Diagnosis (CHF, PN, MI, COPD)? No    Initial Discharge Plan? (Note: please see concurrent daily documentation for any updates after initial note). PT'S SON SHAUNNA WAS PRESENT-HE WOULD LIKE PT TO RETURN TO [facility] FOR CARE UPON DC.      Readmission Risk              Risk of Unplanned Readmission:  12         Electronically signed by JASON Monge, ESTUARDOW on 9/7/2021 at 10:26 AM

## 2021-09-07 NOTE — H&P
Note dictated    L distal femur fracture below a prior hip DHS/plate  Will require ORIF of femur    Risks noted with pt and family  Poss OR today

## 2021-09-07 NOTE — PROGRESS NOTES
Patient is scheduled to have ORIF of left femur today with Dr. Vaelntín Tee. Patients son Shanae Knox at bedside was informed of surgery that will be performed and provided his written consent for surgery with the patients verbal consent. Nurse reports patient to be hypotensive with SBP of 84. Reporting that patient had labile blood pressure last evening requiring fluid bolus in which she responded well to. The patient is alert and participating in conversation. Will provide her with a 250 cc IV fluid bolus now and repeat stat CBC to rule out worsening anemia secondary to left femur fracture. Will continue to monitor patients response to fluids. Will update anesthesia and surgeon on patients status. Spoke to Dr. Evelyne Edmond who is in agreement with rechecking CBC and will update once this has resulted.

## 2021-09-07 NOTE — PROGRESS NOTES
Patient ID:  Brittney Greco  [id number]  28 y.o.  12/4/1928  BOVIE PAD SITE CLEAR AND INTACT PRE AND POST OP. TAKEN TO PACU,   ATTACHED TO MONITOR AND REPORT GIVEN TO RN.   VSS DRSG DRY AND INTACT, KNEE IMMOBILIZER ON        Electronically signed by Marina Thompson RN on 9/7/2021

## 2021-09-07 NOTE — ANESTHESIA POSTPROCEDURE EVALUATION
Department of Anesthesiology  Postprocedure Note    Patient: Andrew Brandt  MRN: [id number]  YOB: 1928  Date of evaluation: 9/7/2021  Time:  2:38 PM     Procedure Summary     Date: 09/07/21 Room / Location: [address]    Anesthesia Start: 1200 Anesthesia Stop: 8259    Procedure: OPEN REDUCTION INTERNAL FIXATION LEFT FEMUR FRACTURE (Left Thigh) Diagnosis: (LEFT FEMUR FRACTURE)    Surgeons: Kiran Weir MD Responsible Provider: Cydney Blackwood MD    Anesthesia Type: general ASA Status: 3          Anesthesia Type: general    Primitivo Phase I: Primitivo Score: 10    Primitivo Phase II:      Last vitals: Reviewed and per EMR flowsheets.        Anesthesia Post Evaluation    Patient location during evaluation: PACU  Patient participation: waiting for patient participation  Level of consciousness: sleepy but conscious  Airway patency: patent  Nausea & Vomiting: no nausea and no vomiting  Complications: no  Cardiovascular status: blood pressure returned to baseline and hemodynamically stable  Respiratory status: acceptable and face mask  Hydration status: euvolemic

## 2021-09-07 NOTE — ANESTHESIA PROCEDURE NOTES
Peripheral Block    Patient location during procedure: pre-op  Start time: 9/7/2021 11:50 AM  End time: 9/7/2021 11:56 AM  Staffing  Performed: anesthesiologist   Anesthesiologist: Lianet Decker MD  Preanesthetic Checklist  Completed: patient identified, IV checked, site marked, risks and benefits discussed, surgical consent, monitors and equipment checked, pre-op evaluation, timeout performed, anesthesia consent given, oxygen available and patient being monitored  Peripheral Block  Patient position: supine  Prep: ChloraPrep  Patient monitoring: cardiac monitor, continuous pulse ox, frequent blood pressure checks and IV access  Block type: Sciatic  Laterality: left  Injection technique: single-shot  Guidance: ultrasound guided and Patient supine with leg elevated. Lateral, IP approach, probe cover employed  Local infiltration: lidocaine  Infiltration strength: 1 %  Dose: 3 mL  Subgluteal  Provider prep: mask and sterile gloves  Local infiltration: lidocaine  Needle  Needle type: combined needle/nerve stimulator   Needle gauge: 21 G  Needle length: 10 cm  Needle localization: ultrasound guidance  Assessment  Injection assessment: negative aspiration for heme, no paresthesia on injection and local visualized surrounding nerve on ultrasound  Paresthesia pain: none  Slow fractionated injection: yes  Hemodynamics: stable  Additional Notes  Total of 20cc of 0.25% ropivicaine with 5mg decadron injected.   Reason for block: post-op pain management and at surgeon's request

## 2021-09-07 NOTE — ANESTHESIA PRE PROCEDURE
Department of Anesthesiology  Preprocedure Note       Name:  Percy Cantu   Age:  80 y.o.  :  1928                                          MRN:  [id number]         Date:  2021      Surgeon: Helene Keene):  Rina Anton MD    Procedure: Procedure(s):  OPEN REDUCTION INTERNAL FIXATION LEFT FEMUR FRACTURE SYNTHES ROOM 229    Medications prior to admission:   Prior to Admission medications    Medication Sig Start Date End Date Taking?  Authorizing Provider   lisinopril (PRINIVIL;ZESTRIL) 10 MG tablet Take 10 mg by mouth daily   Yes Historical Provider, MD   bisacodyl (DULCOLAX) 10 MG suppository Place 10 mg rectally daily as needed for Constipation   Yes Historical Provider, MD   vitamin D3 (CHOLECALCIFEROL) 10 MCG (400 UNIT) TABS tablet Take 400 Units by mouth daily   Yes Historical Provider, MD   tolterodine (DETROL LA) 2 MG extended release capsule Take 2 mg by mouth daily   Yes Historical Provider, MD   aspirin 325 MG EC tablet Take 325 mg by mouth daily   Yes Historical Provider, MD   polyethylene glycol (GLYCOLAX) packet Take 12 g by mouth daily    Yes Historical Provider, MD   cyanocobalamin 500 MCG tablet Take 500 mcg by mouth daily   Yes Historical Provider, MD   labetalol (NORMODYNE) 100 MG tablet Take 200 mg by mouth 2 times daily    Yes Historical Provider, MD   atorvastatin (LIPITOR) 10 MG tablet Take 10 mg by mouth daily   Yes Historical Provider, MD   levothyroxine (SYNTHROID) 100 MCG tablet Take 100 mcg by mouth Daily   Yes Historical Provider, MD   latanoprost (XALATAN) 0.005 % ophthalmic solution Place 1 drop into the left eye daily   Yes Historical Provider, MD       Current medications:    Current Facility-Administered Medications   Medication Dose Route Frequency Provider Last Rate Last Admin    0.9 % sodium chloride bolus  250 mL IntraVENous Once AdjROMEL Carrasquillo -  mL/hr at 21 1009 250 mL at 21 1009    atorvastatin (LIPITOR) tablet 10 mg  10 mg Oral Daily Danica Reich   10 mg at 09/07/21 9673    vitamin B-12 (CYANOCOBALAMIN) tablet 500 mcg  500 mcg Oral Daily DENA Reich   500 mcg at 09/07/21 0938    dorzolamide (TRUSOPT) 2 % ophthalmic solution 1 drop  1 drop Left Eye BID [place], PA   1 drop at 09/07/21 0940    labetalol (NORMODYNE) tablet 200 mg  200 mg Oral BID DENA Reich        latanoprost (XALATAN) 0.005 % ophthalmic solution 1 drop  1 drop Left Eye Daily DENA Reich   1 drop at 09/07/21 0940    levothyroxine (SYNTHROID) tablet 100 mcg  100 mcg Oral Daily DENA Reich   100 mcg at 09/07/21 0658    polyethylene glycol (GLYCOLAX) packet 12 g  12 g Oral Daily DENA Reich   12 g at 09/07/21 6839    morphine (PF) injection 2 mg  2 mg IntraVENous Q2H PRN DENA Reich        ondansetron (ZOFRAN-ODT) disintegrating tablet 4 mg  4 mg Oral Q8H PRN DENA Reich        Or    ondansetron (ZOFRAN) injection 4 mg  4 mg IntraVENous Q6H PRN DENA Reich        0.9 % sodium chloride infusion   IntraVENous Continuous DENA Reich        senna (SENOKOT) tablet 8.6 mg  1 tablet Oral Daily PRN DENA Reich           Allergies: Allergies   Allergen Reactions    Ibuprofen     Naprosyn [Naproxen]     Plaquenil [Hydroxychloroquine Sulfate]     Tylenol [Acetaminophen]        Problem List:    Patient Active Problem List   Diagnosis Code    Hip fracture requiring operative repair, right, closed, initial [facility] S72.001A    Closed displaced comminuted fracture of shaft of left femur with malunion S72.352P       Past Medical History:  No past medical history on file.     Past Surgical History:        Procedure Laterality Date    HIP FRACTURE SURGERY Right 9/5/2019    HIP OPEN REDUCTION INTERNAL FIXATION performed by Lorenzo Back MD at [place] [address] History:    Social History     Tobacco Use    Smoking status: Never Smoker    Smokeless tobacco: Never Used Substance Use Topics    Alcohol use: Not on file                                Counseling given: Not Answered      Vital Signs (Current):   Vitals:    09/06/21 1800 09/06/21 1845 09/06/21 2033 09/07/21 0900   BP: 126/63 (!) 127/58 (!) 86/38 (!) 84/48   Pulse: 78 82 87 100   Resp: 16   18   Temp:    97.8 °F (36.6 °C)   TempSrc:    Oral   SpO2: 98%   95%   Weight:       Height:                                                  BP Readings from Last 3 Encounters:   09/07/21 (!) 84/48   09/08/19 (!) 156/67   09/05/19 (!) 85/42       NPO Status:                                                                                 BMI:   Wt Readings from Last 3 Encounters:   09/06/21 115 lb (52.2 kg)   09/05/19 117 lb (53.1 kg)   02/09/16 129 lb 6.6 oz (58.7 kg)     Body mass index is 22.46 kg/m². CBC:   Lab Results   Component Value Date    WBC 6.3 09/07/2021    RBC 2.46 09/07/2021    RBC 3.85 01/19/2012    HGB 8.4 09/07/2021    HCT 23.8 09/07/2021    MCV 96.7 09/07/2021    RDW 13.6 09/07/2021     09/07/2021       CMP:   Lab Results   Component Value Date     09/07/2021    K 4.8 09/07/2021    CL 98 09/07/2021    CO2 24 09/07/2021    BUN 23 09/07/2021    CREATININE 0.68 09/07/2021    GFRAA >60.0 09/07/2021    LABGLOM >60.0 09/07/2021    GLUCOSE 131 09/07/2021    GLUCOSE 97 05/11/2012    PROT 5.6 09/06/2021    CALCIUM 8.4 09/07/2021    BILITOT 0.3 09/06/2021    ALKPHOS 57 09/06/2021    AST 14 09/06/2021    ALT 9 09/06/2021       POC Tests: No results for input(s): POCGLU, POCNA, POCK, POCCL, POCBUN, POCHEMO, POCHCT in the last 72 hours.     Coags:   Lab Results   Component Value Date    PROTIME 14.3 09/06/2021    INR 1.1 09/06/2021    APTT 32.9 09/06/2021       HCG (If Applicable): No results found for: PREGTESTUR, PREGSERUM, HCG, HCGQUANT     ABGs: No results found for: PHART, PO2ART, ACC7AAR, GWU1QTP, BEART, Z6JBYTHV     Type & Screen (If Applicable):  No results found for: LABABO, LABRH    Drug/Infectious Status (If Applicable):  No results found for: HIV, HEPCAB    COVID-19 Screening (If Applicable):   Lab Results   Component Value Date    COVID19 Not Detected 09/06/2021           Anesthesia Evaluation  Patient summary reviewed and Nursing notes reviewed no history of anesthetic complications:   Airway: Mallampati: II  TM distance: >3 FB   Neck ROM: full  Mouth opening: > = 3 FB Dental:      Comment: Poor overall dentition    Pulmonary:Negative Pulmonary ROS                              Cardiovascular:    (+) hypertension:, hyperlipidemia      ECG reviewed               Beta Blocker:  Dose within 24 Hrs         Neuro/Psych:   Negative Neuro/Psych ROS              GI/Hepatic/Renal: Neg GI/Hepatic/Renal ROS            Endo/Other:    (+) hypothyroidism, blood dyscrasia: anemia:., .                 Abdominal:             Vascular: negative vascular ROS. Other Findings:           Anesthesia Plan      general     ASA 3     (Femoral and sciatic nerve blocks)  Induction: intravenous. MIPS: Postoperative opioids intended and Prophylactic antiemetics administered. Anesthetic plan and risks discussed with patient.         Attending anesthesiologist reviewed and agrees with Joseph Martinez MD   9/7/2021

## 2021-09-07 NOTE — CARE COORDINATION
ESTUARDOW left VM message with Jeanmarie sanford [place]. Pt is currently a resident/assisted living at [facility]. Pts son would like pt to receive SNF care upon DC. Await return call.

## 2021-09-08 LAB
ANION GAP SERPL CALCULATED.3IONS-SCNC: 10 MEQ/L (ref 9–15)
BLOOD BANK DISPENSE STATUS: NORMAL
BLOOD BANK DISPENSE STATUS: NORMAL
BLOOD BANK PRODUCT CODE: NORMAL
BLOOD BANK PRODUCT CODE: NORMAL
BPU ID: NORMAL
BPU ID: NORMAL
BUN BLDV-MCNC: 30 MG/DL (ref 8–23)
CALCIUM SERPL-MCNC: 7.8 MG/DL (ref 8.5–9.9)
CHLORIDE BLD-SCNC: 98 MEQ/L (ref 95–107)
CO2: 23 MEQ/L (ref 20–31)
CREAT SERPL-MCNC: 0.75 MG/DL (ref 0.5–0.9)
DESCRIPTION BLOOD BANK: NORMAL
DESCRIPTION BLOOD BANK: NORMAL
EKG ATRIAL RATE: 61 BPM
EKG P AXIS: 71 DEGREES
EKG P-R INTERVAL: 202 MS
EKG Q-T INTERVAL: 430 MS
EKG QRS DURATION: 104 MS
EKG QTC CALCULATION (BAZETT): 432 MS
EKG R AXIS: -28 DEGREES
EKG T AXIS: 3 DEGREES
EKG VENTRICULAR RATE: 61 BPM
FERRITIN: 383.7 NG/ML (ref 13–150)
FOLATE: 11.7 NG/ML (ref 7.3–26.1)
GFR AFRICAN AMERICAN: >60
GFR NON-AFRICAN AMERICAN: >60
GLUCOSE BLD-MCNC: 122 MG/DL (ref 70–99)
HCT VFR BLD CALC: 13.7 % (ref 37–47)
HCT VFR BLD CALC: 19.3 % (ref 37–47)
HCT VFR BLD CALC: 26.2 % (ref 37–47)
HEMOGLOBIN: 4.8 G/DL (ref 12–16)
HEMOGLOBIN: 6.8 G/DL (ref 12–16)
HEMOGLOBIN: 8.9 G/DL (ref 12–16)
IRON SATURATION: 36 % (ref 11–46)
IRON: 51 UG/DL (ref 37–145)
MCH RBC QN AUTO: 34 PG (ref 27–31.3)
MCHC RBC AUTO-ENTMCNC: 35.1 % (ref 33–37)
MCV RBC AUTO: 96.9 FL (ref 82–100)
PDW BLD-RTO: 13.9 % (ref 11.5–14.5)
PLATELET # BLD: 147 K/UL (ref 130–400)
POTASSIUM SERPL-SCNC: 4.3 MEQ/L (ref 3.4–4.9)
RBC # BLD: 1.41 M/UL (ref 4.2–5.4)
SODIUM BLD-SCNC: 131 MEQ/L (ref 135–144)
TOTAL IRON BINDING CAPACITY: 141 UG/DL (ref 178–450)
VITAMIN B-12: 1739 PG/ML (ref 232–1245)
WBC # BLD: 7.9 K/UL (ref 4.8–10.8)

## 2021-09-08 PROCEDURE — 6370000000 HC RX 637 (ALT 250 FOR IP): Performed by: INTERNAL MEDICINE

## 2021-09-08 PROCEDURE — 83550 IRON BINDING TEST: CPT

## 2021-09-08 PROCEDURE — 6370000000 HC RX 637 (ALT 250 FOR IP): Performed by: ORTHOPAEDIC SURGERY

## 2021-09-08 PROCEDURE — 36415 COLL VENOUS BLD VENIPUNCTURE: CPT

## 2021-09-08 PROCEDURE — 6370000000 HC RX 637 (ALT 250 FOR IP): Performed by: NURSE PRACTITIONER

## 2021-09-08 PROCEDURE — 93010 ELECTROCARDIOGRAM REPORT: CPT | Performed by: INTERNAL MEDICINE

## 2021-09-08 PROCEDURE — 1210000000 HC MED SURG R&B

## 2021-09-08 PROCEDURE — 2580000003 HC RX 258: Performed by: PHYSICIAN ASSISTANT

## 2021-09-08 PROCEDURE — 82746 ASSAY OF FOLIC ACID SERUM: CPT

## 2021-09-08 PROCEDURE — 85014 HEMATOCRIT: CPT

## 2021-09-08 PROCEDURE — 80048 BASIC METABOLIC PNL TOTAL CA: CPT

## 2021-09-08 PROCEDURE — 94761 N-INVAS EAR/PLS OXIMETRY MLT: CPT

## 2021-09-08 PROCEDURE — 6370000000 HC RX 637 (ALT 250 FOR IP): Performed by: PHYSICIAN ASSISTANT

## 2021-09-08 PROCEDURE — P9016 RBC LEUKOCYTES REDUCED: HCPCS

## 2021-09-08 PROCEDURE — 6360000002 HC RX W HCPCS: Performed by: ORTHOPAEDIC SURGERY

## 2021-09-08 PROCEDURE — 2580000003 HC RX 258: Performed by: INTERNAL MEDICINE

## 2021-09-08 PROCEDURE — 85027 COMPLETE CBC AUTOMATED: CPT

## 2021-09-08 PROCEDURE — 82607 VITAMIN B-12: CPT

## 2021-09-08 PROCEDURE — 36430 TRANSFUSION BLD/BLD COMPNT: CPT

## 2021-09-08 PROCEDURE — 82728 ASSAY OF FERRITIN: CPT

## 2021-09-08 PROCEDURE — 83540 ASSAY OF IRON: CPT

## 2021-09-08 PROCEDURE — 2700000000 HC OXYGEN THERAPY PER DAY

## 2021-09-08 PROCEDURE — 2580000003 HC RX 258: Performed by: ORTHOPAEDIC SURGERY

## 2021-09-08 PROCEDURE — 85018 HEMOGLOBIN: CPT

## 2021-09-08 RX ORDER — SODIUM CHLORIDE 9 MG/ML
INJECTION, SOLUTION INTRAVENOUS PRN
Status: DISCONTINUED | OUTPATIENT
Start: 2021-09-08 | End: 2021-09-11 | Stop reason: HOSPADM

## 2021-09-08 RX ORDER — SODIUM CHLORIDE 9 MG/ML
INJECTION, SOLUTION INTRAVENOUS PRN
Status: COMPLETED | OUTPATIENT
Start: 2021-09-08 | End: 2021-09-08

## 2021-09-08 RX ORDER — ASCORBIC ACID 500 MG
500 TABLET ORAL 2 TIMES DAILY
Status: DISCONTINUED | OUTPATIENT
Start: 2021-09-08 | End: 2021-09-11 | Stop reason: HOSPADM

## 2021-09-08 RX ORDER — FUROSEMIDE 10 MG/ML
20 INJECTION INTRAMUSCULAR; INTRAVENOUS SEE ADMIN INSTRUCTIONS
Status: DISCONTINUED | OUTPATIENT
Start: 2021-09-08 | End: 2021-09-11 | Stop reason: HOSPADM

## 2021-09-08 RX ORDER — IRON POLYSACCHARIDE COMPLEX 150 MG
150 CAPSULE ORAL DAILY
Status: DISCONTINUED | OUTPATIENT
Start: 2021-09-08 | End: 2021-09-11 | Stop reason: HOSPADM

## 2021-09-08 RX ADMIN — SODIUM CHLORIDE, PRESERVATIVE FREE 10 ML: 5 INJECTION INTRAVENOUS at 10:01

## 2021-09-08 RX ADMIN — Medication 400 UNITS: at 09:46

## 2021-09-08 RX ADMIN — DOCUSATE SODIUM 50 MG AND SENNOSIDES 8.6 MG 1 TABLET: 8.6; 5 TABLET, FILM COATED ORAL at 20:47

## 2021-09-08 RX ADMIN — Medication 150 MG: at 09:57

## 2021-09-08 RX ADMIN — OXYCODONE HYDROCHLORIDE AND ACETAMINOPHEN 500 MG: 500 TABLET ORAL at 09:57

## 2021-09-08 RX ADMIN — CEFAZOLIN 2000 MG: 10 INJECTION, POWDER, FOR SOLUTION INTRAVENOUS at 00:00

## 2021-09-08 RX ADMIN — SODIUM CHLORIDE: 9 INJECTION, SOLUTION INTRAVENOUS at 07:53

## 2021-09-08 RX ADMIN — DORZOLAMIDE HYDROCHLORIDE 1 DROP: 20 SOLUTION/ DROPS OPHTHALMIC at 09:57

## 2021-09-08 RX ADMIN — DORZOLAMIDE HYDROCHLORIDE 1 DROP: 20 SOLUTION/ DROPS OPHTHALMIC at 20:48

## 2021-09-08 RX ADMIN — CYANOCOBALAMIN TAB 500 MCG 500 MCG: 500 TAB at 09:43

## 2021-09-08 RX ADMIN — LABETALOL HYDROCHLORIDE 200 MG: 200 TABLET, FILM COATED ORAL at 20:46

## 2021-09-08 RX ADMIN — SODIUM CHLORIDE, PRESERVATIVE FREE 10 ML: 5 INJECTION INTRAVENOUS at 20:47

## 2021-09-08 RX ADMIN — ATORVASTATIN CALCIUM 10 MG: 10 TABLET, FILM COATED ORAL at 09:43

## 2021-09-08 RX ADMIN — OXYCODONE HYDROCHLORIDE AND ACETAMINOPHEN 500 MG: 500 TABLET ORAL at 20:47

## 2021-09-08 RX ADMIN — SODIUM CHLORIDE: 9 INJECTION, SOLUTION INTRAVENOUS at 09:25

## 2021-09-08 RX ADMIN — LEVOTHYROXINE SODIUM 100 MCG: 100 TABLET ORAL at 09:43

## 2021-09-08 RX ADMIN — LATANOPROST 1 DROP: 50 SOLUTION OPHTHALMIC at 09:43

## 2021-09-08 ASSESSMENT — PAIN SCALES - GENERAL
PAINLEVEL_OUTOF10: 0

## 2021-09-08 NOTE — OP NOTE
Torrie [address]                      [facility], [address] [facility]                                OPERATIVE REPORT    PATIENT NAME: Jerome Valdez                 :        1928  MED REC NO:   [id number]                            ROOM:       N229  ACCOUNT NO:   [de-identified]                           ADMIT DATE: 2021  PROVIDER:     Hector Francisco MD    DATE OF PROCEDURE:  2021    LOCATION:  [facility]. PREOPERATIVE DIAGNOSIS:  Comminuted left periprosthetic femur fracture. POSTOPERATIVE DIAGNOSIS:  Comminuted left periprosthetic femur fracture. OPERATION PERFORMED:  ORIF of left periprosthetic femur fracture using a  Synthes 4.5-mm plate with screw and cerclage fixation. SURGEON:  Hector Francisco MD.    ASSISTANT:  Bin Cuevas PA-C was present throughout the entire case. Given the nature of the disease process and the procedure, a skilled  surgical first assistant was necessary during the case. The assistant  was necessary to hold retractors and manipulate the extremity during the  procedure. A certified scrub tech was at the back table managing the  instruments and supplies for the surgical case. ANESTHESIA: General endotracheal.    COMPLICATIONS:  None. EBL:  200 mL. INDICATIONS:  The patient is a 80-year-old female who fell injuring her  left leg. She was found to have a comminuted femur fracture below a  prior DHS hip fixation device. Risks and benefits of operative  intervention were discussed at length with the patient and family. These do include infection, stiffness, nerve damage, continued pain and  deformity as well as need for subsequent operation. Informed consent  was obtained prior to arrival in the operating room. OPERATIVE PROCEDURE:  Upon arrival, the patient was identified. She was  transported from a stretcher to the operating table and placed in supine  position.   A general endotracheal anesthetic was administered by the  Anesthesia staff. Prior to start of the case, 2 gm Ancef were given  intravenously. She was positioned with the right side down in the  lateral decubitus. All bony prominences were adequately padded. Her  left hip and leg were prepped and draped in usual sterile fashion. Standard lateral approach to the left femur was utilized. Incision was  carried through the subcutaneous tissue. Hemostasis was obtained. Fascia was incised and the fracture was easily identified. There was  some comminution distally at the tip of the fracture and a spiral  oblique fracture extending proximally toward the tip of the previously  placed plate. After thorough debridement of all fracture fragments, we  were able to anatomically align the majority of the fracture pieces. Interfragmentary screw was placed with good purchase noted. A standard  4.5-mm Synthes large plate was chosen as it was slightly thinner than  the distal periarticular plate and allowed us to skive anterior to the  previous hip plate. Combination of locking and nonlocking screws were  utilized with good purchase noted. Distally fixation of metaphysis was  somewhat less and as such multiple cerclage wires were then placed to  further augment stability of the repair. Final x-rays confirmed proper  fracture alignment and hardware placement in all planes. Wounds  irrigated with sterile saline. Fascia was repaired using 0 Vicryl  suture. Subcutaneous tissue was closed with 2-0 Vicryl and skin was  approximated with staples. All sponge and needle counts were correct  prior to closure. Sterile dressing was applied. She was placed into a  brace and awoken from anesthetic. She was taken to recovery room in  stable condition.         Lisa Hall MD    D: 09/07/2021 14:25:22       T: 09/07/2021 14:29:19     [email or web address]  Job#: [id number]     Doc#: [id number]    CC:

## 2021-09-08 NOTE — PROGRESS NOTES
[facility] Occupational Therapy      Date: 2021  Patient Name: Karel Herrera        MRN: [id number]  Account: [de-identified]   : 1928  (80 y.o.)  Room: [place] [address]    Chart reviewed, attempted OT at [phone] for evaluation. Patient not seen 2° to:    Hold per nursing request due to: Low hemoglobin. Spoke to [employer] RN aware. Will attempt again when able.     Electronically signed by JUAN MIGUEL Hedrick on 7878 at 9:51 AM

## 2021-09-08 NOTE — PROGRESS NOTES
DAILY PROGRESS NOTE      POD: 1ORIF of left periprosthetic femur fracture. Patient doing well  Vitals:    21 0830   BP: (!) 115/48   Pulse: 105   Resp:    Temp:    SpO2:       Temp (24hrs), Av.7 °F (36.5 °C), Min:69.1 °F (20.6 °C), Max:99.3 °F (37.4 °C)       Pain Control Good  No chest pain or shortness of breath. No calf pain    Exam:   Incision(s): Bulky dressing over surgical incision, incision unable to be viewed  Dressing clean, dry, and intact  Operative extremity shows neuro vascular status intact. Flexion and extension intact on operative extremity  Calves soft and non-tender without evidence of DVT. .      Labs reviewed:  CBC:   Recent Labs     21  0606 21  1035 21  0826   WBC 6.3 6.5 7.9   HGB 8.4* 8.5* 4.8*    188 147     BMP:    Recent Labs     21  1215 21  0606 21  0826   * 131* 131*   K 4.0 4.8 4.3   CL 92* 98 98   CO2 29 24 23   BUN 12 23 30*   CREATININE 0.52 0.68 0.75   GLUCOSE 106* 131* 122*       I&O  I/O last 3 completed shifts: In: 1320 [P.O.:120; I.V.:1200]  Out: 975 [Urine:975]      Assessment:  Good Post Operative Course. Patient awake, alert to person and place, reports to have no pain at this time. Acute blood loss anemia secondary to fracture and surgery with critical hgb of 5.1 called to nurse from lab. Lab asked to redraw this to confirm and will transfuse one unit of packed red blood cells accordingly. The patient is pale in color. There is no signs of active bleeding, left thigh is edematous however supple. The patient denies any chest pain, shortness of breath, dizziness, lightheadedness or fatigue. She remains hemodynamically stable. Will continue to monitor with daily CBC's and symptomatic anemia. 7705 Repeat hgb 4.8. Patient remains hemodynamically stable at this time. Patient to get two units of packed red blood cells.  Will continue to monitor for symptoms of anemia and signs of hemodynamic instability. Patient to be non weight bearing of operative extremity with use of walker for assistance. Lovenox placed on hold today secondary to acute blood loss anemia with hgb this morning of 5.1. Will resume when hgb is stable. Will add ascorbic acid and nifferex supplementation as well. Hold PT/OT today secondary to acute blood loss anemia and need for blood transfusion. Plan:     1. Continue post-op course  2. Stat CBC to confirm critical lab of 5.1. Will transfuse one unit of packed red blood cells accordingly. Continue to monitor daily CBC's while patient in hospital.   3. PT/OT: Hold today secondary to critical acute blood loss anemia secondary to femur fracture and surgery  4. Continue pain control:  5. Hold lovenox today and will resume when hgb stable. 6. Discharge planning: patient is a resident of [facility], family and patient would like for patient to return to [facility] upon discharge. Madeline referral placed and awaiting.      ROMEL Hewitt - CNP MD  9/8/2021 9:19 AM

## 2021-09-08 NOTE — FLOWSHEET NOTE
1201-- took on care of patient, Patient assessment complete, awake and alert; oriented to person and time disoriented to place, patient denies pain , denies n/v, denies cp & sob, call light in reach, bed in lowest position, bed alarm engaged, will continue to monitor      9065-- patient had medium sized bowel movement, loose and brown, patient cleaned up, alamo emptied, dressing to left hip changed, scant drainage to old dressing, patient tolerated well, will continue to monitor.

## 2021-09-08 NOTE — PROGRESS NOTES
Interval:  Pt with severe post op anemia, mildy tachy, otherwise denies symptoms including CP, SOB, lightheadedness or weakness. ROS: 12 point review systems negative except was stated in HPI  No past medical history on file. Past Surgical History:   Procedure Laterality Date    FEMUR FRACTURE SURGERY Left 9/7/2021    OPEN REDUCTION INTERNAL FIXATION LEFT FEMUR FRACTURE performed by Delora Klinefelter, MD at [address] Right 9/5/2019    HIP OPEN REDUCTION INTERNAL FIXATION performed by Iban Medina MD at [facility] Marital status:      Spouse name: Not on file    Number of children: Not on file    Years of education: Not on file    Highest education level: Not on file   Occupational History    Not on file   Tobacco Use    Smoking status: Never Smoker    Smokeless tobacco: Never Used   Vaping Use    Vaping Use: Never used   Substance and Sexual Activity    Alcohol use: Not on file    Drug use: Not on file    Sexual activity: Not on file   Other Topics Concern    Not on file   Social History Narrative    Not on file     Social Determinants of Health     Financial Resource Strain:     Difficulty of Paying Living Expenses:    Food Insecurity:     Worried About Running Out of Food in the Last Year:     [address] in the Last Year:    Transportation Needs:     Lack of Transportation (Medical):      Lack of Transportation (Non-Medical):    Physical Activity:     Days of Exercise per Week:     Minutes of Exercise per Session:    Stress:     Feeling of Stress :    Social Connections:     Frequency of Communication with Friends and Family:     Frequency of Social Gatherings with Friends and Family:     Attends Zoroastrian Services:     Active Member of Clubs or Organizations:     Attends Club or Organization Meetings:     Marital Status:    Intimate Partner Violence:     Fear of Current or Ex-Partner:     Emotionally Abused:     Physically Abused:     Sexually Abused:      No family history on file. No current facility-administered medications on file prior to encounter. Current Outpatient Medications on File Prior to Encounter   Medication Sig Dispense Refill    lisinopril (PRINIVIL;ZESTRIL) 10 MG tablet Take 10 mg by mouth daily      bisacodyl (DULCOLAX) 10 MG suppository Place 10 mg rectally daily as needed for Constipation      vitamin D3 (CHOLECALCIFEROL) 10 MCG (400 UNIT) TABS tablet Take 400 Units by mouth daily      tolterodine (DETROL LA) 2 MG extended release capsule Take 2 mg by mouth daily      aspirin 325 MG EC tablet Take 325 mg by mouth daily      polyethylene glycol (GLYCOLAX) packet Take 12 g by mouth daily       cyanocobalamin 500 MCG tablet Take 500 mcg by mouth daily      labetalol (NORMODYNE) 100 MG tablet Take 200 mg by mouth 2 times daily       atorvastatin (LIPITOR) 10 MG tablet Take 10 mg by mouth daily      levothyroxine (SYNTHROID) 100 MCG tablet Take 100 mcg by mouth Daily      latanoprost (XALATAN) 0.005 % ophthalmic solution Place 1 drop into the left eye daily       Lab Results   Component Value Date    WBC 7.9 09/08/2021    HGB 6.8 (LL) 09/08/2021    HCT 19.3 (LL) 09/08/2021    MCV 96.9 09/08/2021     09/08/2021     Lab Results   Component Value Date     09/08/2021    K 4.3 09/08/2021    K 4.8 09/07/2021    CL 98 09/08/2021    CO2 23 09/08/2021    BUN 30 09/08/2021    CREATININE 0.75 09/08/2021    GLUCOSE 122 09/08/2021    GLUCOSE 97 05/11/2012    CALCIUM 7.8 09/08/2021     GEN: Alert and oriented. NAD, +Pallor  HEENT: Normocephalic, atraumatic. BERENICE, Neck supple. Resp: Faint crackles at bases, no rhonchi No respiratory distress  Cardio: RRR  Abd: Soft, nondistended. NTTP. BS present  Ext: No erythema or edema.  LE NTTP  Neuro: A&O X3            Acute blood loss anemia, post surgical  History of Fe def  - Stat 2U for Hbg 4.8, additional 1 U this afternoon   -20 IV Lasix x1, avoid fluid overload  -Iron studies OK, continue PO iron  -B12, Folate ok  -Monitor CBC       HTN  Hold nephrotoxic agents   H/o CVa  Resume ASA when ok with surgery   DVT ppx    10mins discussing POC with Pt's son at bedside.

## 2021-09-09 LAB
ANION GAP SERPL CALCULATED.3IONS-SCNC: 9 MEQ/L (ref 9–15)
BASOPHILS ABSOLUTE: 0 K/UL (ref 0–0.2)
BASOPHILS RELATIVE PERCENT: 0.1 %
BUN BLDV-MCNC: 22 MG/DL (ref 8–23)
CALCIUM SERPL-MCNC: 7.9 MG/DL (ref 8.5–9.9)
CHLORIDE BLD-SCNC: 99 MEQ/L (ref 95–107)
CO2: 22 MEQ/L (ref 20–31)
CREAT SERPL-MCNC: 0.49 MG/DL (ref 0.5–0.9)
EOSINOPHILS ABSOLUTE: 0 K/UL (ref 0–0.7)
EOSINOPHILS RELATIVE PERCENT: 0 %
GFR AFRICAN AMERICAN: >60
GFR NON-AFRICAN AMERICAN: >60
GLUCOSE BLD-MCNC: 157 MG/DL (ref 70–99)
HCT VFR BLD CALC: 24.1 % (ref 37–47)
HEMOGLOBIN: 8.5 G/DL (ref 12–16)
LYMPHOCYTES ABSOLUTE: 0.5 K/UL (ref 1–4.8)
LYMPHOCYTES RELATIVE PERCENT: 5.9 %
MCH RBC QN AUTO: 31.6 PG (ref 27–31.3)
MCHC RBC AUTO-ENTMCNC: 35.3 % (ref 33–37)
MCV RBC AUTO: 89.5 FL (ref 82–100)
MONOCYTES ABSOLUTE: 1 K/UL (ref 0.2–0.8)
MONOCYTES RELATIVE PERCENT: 11.3 %
NEUTROPHILS ABSOLUTE: 7.3 K/UL (ref 1.4–6.5)
NEUTROPHILS RELATIVE PERCENT: 82.7 %
PDW BLD-RTO: 17.4 % (ref 11.5–14.5)
PLATELET # BLD: 135 K/UL (ref 130–400)
POTASSIUM REFLEX MAGNESIUM: 4.2 MEQ/L (ref 3.4–4.9)
RBC # BLD: 2.69 M/UL (ref 4.2–5.4)
SODIUM BLD-SCNC: 130 MEQ/L (ref 135–144)
WBC # BLD: 8.8 K/UL (ref 4.8–10.8)

## 2021-09-09 PROCEDURE — 6370000000 HC RX 637 (ALT 250 FOR IP): Performed by: NURSE PRACTITIONER

## 2021-09-09 PROCEDURE — 6370000000 HC RX 637 (ALT 250 FOR IP): Performed by: INTERNAL MEDICINE

## 2021-09-09 PROCEDURE — 1210000000 HC MED SURG R&B

## 2021-09-09 PROCEDURE — 80048 BASIC METABOLIC PNL TOTAL CA: CPT

## 2021-09-09 PROCEDURE — 36415 COLL VENOUS BLD VENIPUNCTURE: CPT

## 2021-09-09 PROCEDURE — 6370000000 HC RX 637 (ALT 250 FOR IP): Performed by: ORTHOPAEDIC SURGERY

## 2021-09-09 PROCEDURE — 2580000003 HC RX 258: Performed by: INTERNAL MEDICINE

## 2021-09-09 PROCEDURE — 85025 COMPLETE CBC W/AUTO DIFF WBC: CPT

## 2021-09-09 PROCEDURE — 6370000000 HC RX 637 (ALT 250 FOR IP): Performed by: PHYSICIAN ASSISTANT

## 2021-09-09 PROCEDURE — 97166 OT EVAL MOD COMPLEX 45 MIN: CPT

## 2021-09-09 PROCEDURE — 97163 PT EVAL HIGH COMPLEX 45 MIN: CPT

## 2021-09-09 PROCEDURE — 2580000003 HC RX 258: Performed by: ORTHOPAEDIC SURGERY

## 2021-09-09 PROCEDURE — 2700000000 HC OXYGEN THERAPY PER DAY

## 2021-09-09 RX ORDER — SENNA PLUS 8.6 MG/1
1 TABLET ORAL 2 TIMES DAILY
Qty: 20 TABLET | Refills: 0 | Status: SHIPPED | OUTPATIENT
Start: 2021-09-09 | End: 2021-09-19

## 2021-09-09 RX ORDER — ASCORBIC ACID 500 MG
500 TABLET ORAL 2 TIMES DAILY
Qty: 30 TABLET | Refills: 0 | Status: SHIPPED | OUTPATIENT
Start: 2021-09-09 | End: 2021-09-24

## 2021-09-09 RX ORDER — SODIUM CHLORIDE 9 MG/ML
INJECTION, SOLUTION INTRAVENOUS CONTINUOUS
Status: ACTIVE | OUTPATIENT
Start: 2021-09-09 | End: 2021-09-09

## 2021-09-09 RX ORDER — IRON POLYSACCHARIDE COMPLEX 150 MG
150 CAPSULE ORAL DAILY
Qty: 30 CAPSULE | Refills: 0 | Status: SHIPPED | OUTPATIENT
Start: 2021-09-10 | End: 2021-10-10

## 2021-09-09 RX ADMIN — CYANOCOBALAMIN TAB 500 MCG 500 MCG: 500 TAB at 08:38

## 2021-09-09 RX ADMIN — LEVOTHYROXINE SODIUM 100 MCG: 100 TABLET ORAL at 06:42

## 2021-09-09 RX ADMIN — SODIUM CHLORIDE: 9 INJECTION, SOLUTION INTRAVENOUS at 21:22

## 2021-09-09 RX ADMIN — LATANOPROST 1 DROP: 50 SOLUTION OPHTHALMIC at 10:21

## 2021-09-09 RX ADMIN — DOCUSATE SODIUM 50 MG AND SENNOSIDES 8.6 MG 1 TABLET: 8.6; 5 TABLET, FILM COATED ORAL at 08:38

## 2021-09-09 RX ADMIN — DORZOLAMIDE HYDROCHLORIDE 1 DROP: 20 SOLUTION/ DROPS OPHTHALMIC at 21:23

## 2021-09-09 RX ADMIN — SODIUM CHLORIDE, PRESERVATIVE FREE 10 ML: 5 INJECTION INTRAVENOUS at 10:22

## 2021-09-09 RX ADMIN — Medication 400 UNITS: at 08:38

## 2021-09-09 RX ADMIN — POLYETHYLENE GLYCOL 3350 12 G: 17 POWDER, FOR SOLUTION ORAL at 08:38

## 2021-09-09 RX ADMIN — OXYCODONE 5 MG: 5 TABLET ORAL at 08:37

## 2021-09-09 RX ADMIN — Medication 150 MG: at 08:38

## 2021-09-09 RX ADMIN — ATORVASTATIN CALCIUM 10 MG: 10 TABLET, FILM COATED ORAL at 08:38

## 2021-09-09 RX ADMIN — DORZOLAMIDE HYDROCHLORIDE 1 DROP: 20 SOLUTION/ DROPS OPHTHALMIC at 10:22

## 2021-09-09 RX ADMIN — LABETALOL HYDROCHLORIDE 200 MG: 200 TABLET, FILM COATED ORAL at 08:38

## 2021-09-09 RX ADMIN — OXYCODONE HYDROCHLORIDE AND ACETAMINOPHEN 500 MG: 500 TABLET ORAL at 08:38

## 2021-09-09 ASSESSMENT — PAIN DESCRIPTION - DESCRIPTORS
DESCRIPTORS: SORE;ACHING
DESCRIPTORS: ACHING;SORE

## 2021-09-09 ASSESSMENT — PAIN SCALES - GENERAL
PAINLEVEL_OUTOF10: 0
PAINLEVEL_OUTOF10: 4
PAINLEVEL_OUTOF10: 4
PAINLEVEL_OUTOF10: 0
PAINLEVEL_OUTOF10: 0

## 2021-09-09 ASSESSMENT — PAIN DESCRIPTION - PAIN TYPE
TYPE: ACUTE PAIN
TYPE: ACUTE PAIN

## 2021-09-09 ASSESSMENT — PAIN - FUNCTIONAL ASSESSMENT: PAIN_FUNCTIONAL_ASSESSMENT: PREVENTS OR INTERFERES WITH ALL ACTIVE AND SOME PASSIVE ACTIVITIES

## 2021-09-09 ASSESSMENT — PAIN DESCRIPTION - FREQUENCY: FREQUENCY: INTERMITTENT

## 2021-09-09 ASSESSMENT — PAIN DESCRIPTION - ORIENTATION
ORIENTATION: LEFT
ORIENTATION: LEFT

## 2021-09-09 ASSESSMENT — PAIN SCALES - WONG BAKER: WONGBAKER_NUMERICALRESPONSE: 2

## 2021-09-09 ASSESSMENT — PAIN DESCRIPTION - LOCATION
LOCATION: LEG;HIP
LOCATION: HIP

## 2021-09-09 ASSESSMENT — PAIN DESCRIPTION - PROGRESSION: CLINICAL_PROGRESSION: GRADUALLY IMPROVING

## 2021-09-09 NOTE — PROGRESS NOTES
Physical Therapy Med Surg Initial Assessment  [facility]  Room: N229/N229-01       NAME: Yuni Austin  : 1928 (80 y.o.)  MRN: [id number]  CODE STATUS: DNR-CC    Date of Service: 2021    Patient Diagnosis(es): Hyponatremia [E87.1]  DNR (do not resuscitate) [place]  Closed displaced comminuted fracture of shaft of left femur with malunion [S72.352P]  Sarahy-prosthetic fracture of shaft of femur [M97. Jena Paling  Fall from standing, initial encounter [W19. XXXA]   Chief Complaint   Patient presents with    Fall     Patient Active Problem List    Diagnosis Date Noted    Closed displaced comminuted fracture of shaft of left femur with malunion 2021    Hip fracture requiring operative repair, right, closed, initial encounter ([facility] [address] 2019        No past medical history on file. Past Surgical History:   Procedure Laterality Date    FEMUR FRACTURE SURGERY Left 2021    OPEN REDUCTION INTERNAL FIXATION LEFT FEMUR FRACTURE performed by Ashley Choudhury MD at [employer] Right 2019    HIP OPEN REDUCTION INTERNAL FIXATION performed by Efra Hdez MD at [facility] [place]       Chart Reviewed: Yes  Patient assessed for rehabilitation services?: Yes  Additional Pertinent Hx: CT  \"Complex acute fracture involving the mid to distal left femur. \"  ORIF L femur  Family / Caregiver Present: No  General Comment  Comments: Pt laying in bed, agreeable to PT eval    Restrictions:  Restrictions/Precautions: Fall Risk, Weight Bearing  Lower Extremity Weight Bearing Restrictions  Left Lower Extremity Weight Bearing: Non Weight Bearing  Required Braces or Orthoses  Left Lower Extremity Brace: Knee Immobilizer  Position Activity Restriction  Other position/activity restrictions: No rom knee; knee immobilizer at all times with the exception of skin care and hygiene     SUBJECTIVE: Subjective: Pt oriented to self, aware that she fell and broke her leg.  Pt states pain \"almost zero\" at rest    Pain  Pre Treatment Pain Screening  Pain at present: 2  Scale Used:  Faces    Post Treatment Pain Screening:   Pain Screening  Patient Currently in Pain: Yes  Pain Assessment  Pain Assessment: Faces  Magallanes-Baker Pain Rating: Hurts a little bit  Pain Type: Acute pain  Pain Location: Hip  Pain Orientation: Left  Pain Descriptors: Sore;Aching  Pain Frequency: Intermittent  Clinical Progression: Gradually improving  Functional Pain Assessment: Prevents or interferes with all active and some passive activities  Non-Pharmaceutical Pain Intervention(s): Repositioned    Prior Level of Function:  Social/Functional History  Lives With: Alone  Type of Home: Assisted living  Home Layout: One level  Home Access: Level entry  Home Equipment: [employer] 195 (rollator)  Receives Help From: Personal care attendant  ADL Assistance: Needs assistance  Homemaking Assistance: Needs assistance  Homemaking Responsibilities: No  Ambulation Assistance: Independent (rollator)  Transfer Assistance: Independent  Active : No    OBJECTIVE:   Vision: Impaired  Vision Exceptions: Wears glasses at all times  Hearing: Exceptions to [facility]  Hearing Exceptions: Hard of hearing/hearing concerns    Cognition:  Overall Orientation Status: Within Functional Limits  Follows Commands: Impaired (limited by lethargy, increased reps, alertness improved during session)    Observation/Palpation  Posture: Fair  Observation: pleasant, cooperative, lethargic but improves once seated EOB, L LE bandage intact, knee immobilizer donned    ROM:  RLE AROM: WFL  LLE General PROM: functionally able to sit EOB 90 degrees hip flexion    Strength:  Strength LLE  Comment: observed trace movement in toes, ankle and hip  Strength RUE  Comment: observed  2+/5 hip, 3-/5 knee, and 3-/5 ankle    Neuro:  Balance  Sitting - Static: Poor (min to max A, assistance varies, retrpulsive with poor ability to correct with VC/TC and assist)  Sitting - Dynamic: Poor (Max A with any attempt at superimposed UE or LE movement)  Standing - Static:  (no attempted d/t fatigued sitting EOB)     Motor Control  Gross Motor?: Exceptions  Comments: significant weakness/debility  Sensation  Overall Sensation Status: WFL    Bed mobility  Rolling to Left: Dependent/Total  Rolling to Right: Dependent/Total  Supine to Sit: Dependent/Total;2 Person assistance  Sit to Supine: Dependent/Total;2 Person assistance  Scootin Person assistance;Dependent/Total  Comment: hand over hand to initiate pt particpation with ww    Transfers  Sit to Stand: Unable to assess  Stand to sit: Unable to assess  Lateral Transfers: Dependent/Total;2 Person Assistance  Comment: varying assist in sitting unable to progress at this time  Ambulation  Ambulation?: No (unable to progress to standing)  WB Status: L LE NWB              Activity Tolerance  Activity Tolerance: Patient Tolerated treatment well    Exercises  Comments: Pt insturcted in AP     PT Education  PT Education: Goals;PT Role;Plan of Care;Weight-bearing Education    ASSESSMENT:   Body structures, Functions, Activity limitations: Decreased functional mobility ; Decreased strength;Decreased posture;Decreased safe awareness;Decreased ROM; Decreased balance; Increased pain  Decision Making: High Complexity  History: high  Exam: high  Clinical Presentation: high    Prognosis: Good  Barriers to Learning: cognitive deficits    DISCHARGE RECOMMENDATIONS:  Discharge Recommendations: Continue to assess pending progress, Patient would benefit from continued therapy after discharge    Assessment: Pt demonstrates the above deficits and decline in functional mobility s/p fall at assisted living resulting in femur fracture and now NWB s/p ORIF L femur. Pt would benefit from physical therapy to address above deficits and allow for safe return home at highest level of function, decrease risk for falls, and improve QOL.     REQUIRES PT FOLLOW UP: Yes      PLAN OF CARE:  Plan  Times per week: 5-7  Times per day:  (1-2)  Current Treatment Recommendations: Strengthening, Functional Mobility Training, Home Exercise Program, Equipment Evaluation, Education, & procurement, Modalities, Gait Training, Transfer Training, Balance Training, Stair training, Patient/Caregiver Education & Training, Safety Education & Training, Positioning  Safety Devices  Type of devices: Left in bed, Call light within reach, Bed alarm in place    Goals:  Patient goals : \"go home\"  Long term goals  Long term goal 1: Bed mobility with [address] term goal 2: Functional transfers with Max A to promote OOB activity  Long term goal 3: sitting unsupported EOB x10 min with SBA  Long term goal 4: Functional reach in unsupported sitting >6 in OOBOS with ability to return to midline  Long term goal 5: Pt will recall NWB status and maintain during mobility with <25% VC    [facility] (6 CLICK) [address] Mobility Raw Score : 6     Therapy Time:   Individual   Time In 1052   Time Out 1107   Minutes [address] [place] [address], 09/09/21 at 2:19 PM         Definitions for assistance levels  Independent = pt does not require any physical supervision or assistance from another person for activity completion. Device may be needed.   Stand by assistance = pt requires verbal cues or instructions from another person, close to but not touching, to perform the activity  Minimal assistance= pt performs 75% or more of the activity; assistance is required to complete the activity  Moderate assistance= pt performs 50% of the activity; assistance is required to complete the activity  Maximal assistance = pt performs 25% of the activity; assistance is required to complete the activity  Dependent = pt requires total physical assistance to accomplish the task

## 2021-09-09 NOTE — PROGRESS NOTES
Interval:  A.m. hemoglobin is improved posttransfusion. Patient received a total of 3 units. Pain is controlled. She denies chest pain or shortness of breath. Afebrile. ROS: 12 point review systems negative except was stated in HPI  History reviewed. No pertinent past medical history. Past Surgical History:   Procedure Laterality Date    FEMUR FRACTURE SURGERY Left 9/7/2021    OPEN REDUCTION INTERNAL FIXATION LEFT FEMUR FRACTURE performed by Elisa Mcclendon MD at [address] [facility] 9/5/2019    HIP OPEN REDUCTION INTERNAL FIXATION performed by Mago Sauceda MD at [facility] Marital status:      Spouse name: Not on file    Number of children: Not on file    Years of education: Not on file    Highest education level: Not on file   Occupational History    Not on file   Tobacco Use    Smoking status: Never Smoker    Smokeless tobacco: Never Used   Vaping Use    Vaping Use: Never used   Substance and Sexual Activity    Alcohol use: Not on file    Drug use: Not on file    Sexual activity: Not on file   Other Topics Concern    Not on file   Social History Narrative    Not on file     Social Determinants of Health     Financial Resource Strain:     Difficulty of Paying Living Expenses:    Food Insecurity:     Worried About Running Out of Food in the Last Year:     [address] in the Last Year:    Transportation Needs:     Lack of Transportation (Medical):      Lack of Transportation (Non-Medical):    Physical Activity:     Days of Exercise per Week:     Minutes of Exercise per Session:    Stress:     Feeling of Stress :    Social Connections:     Frequency of Communication with Friends and Family:     Frequency of Social Gatherings with Friends and Family:     Attends Zoroastrianism Services:     Active Member of Clubs or Organizations:     Attends Club or Organization Meetings:     Marital Status:    Intimate Partner Violence:     Fear of Current or Ex-Partner:     Emotionally Abused:     Physically Abused:     Sexually Abused:      History reviewed. No pertinent family history. No current facility-administered medications on file prior to encounter. Current Outpatient Medications on File Prior to Encounter   Medication Sig Dispense Refill    lisinopril (PRINIVIL;ZESTRIL) 10 MG tablet Take 10 mg by mouth daily      bisacodyl (DULCOLAX) 10 MG suppository Place 10 mg rectally daily as needed for Constipation      vitamin D3 (CHOLECALCIFEROL) 10 MCG (400 UNIT) TABS tablet Take 400 Units by mouth daily      tolterodine (DETROL LA) 2 MG extended release capsule Take 2 mg by mouth daily      aspirin 325 MG EC tablet Take 325 mg by mouth daily      polyethylene glycol (GLYCOLAX) packet Take 12 g by mouth daily       cyanocobalamin 500 MCG tablet Take 500 mcg by mouth daily      labetalol (NORMODYNE) 100 MG tablet Take 200 mg by mouth 2 times daily       atorvastatin (LIPITOR) 10 MG tablet Take 10 mg by mouth daily      levothyroxine (SYNTHROID) 100 MCG tablet Take 100 mcg by mouth Daily      latanoprost (XALATAN) 0.005 % ophthalmic solution Place 1 drop into the left eye daily       Lab Results   Component Value Date    WBC 8.8 09/09/2021    HGB 8.5 (L) 09/09/2021    HCT 24.1 (L) 09/09/2021    MCV 89.5 09/09/2021     09/09/2021     Lab Results   Component Value Date     09/09/2021    K 4.2 09/09/2021    CL 99 09/09/2021    CO2 22 09/09/2021    BUN 22 09/09/2021    CREATININE 0.49 09/09/2021    GLUCOSE 157 09/09/2021    GLUCOSE 97 05/11/2012    CALCIUM 7.9 09/09/2021     GEN: Alert and oriented. NAD, color is improved today. Patient is fatigued  HEENT: Normocephalic, atraumatic. BERENICE, Neck supple. Resp: Faint crackles at bases, no rhonchi No respiratory distress  Cardio: RRR  Abd: Soft, nondistended. NTTP. BS present  Ext: No erythema or edema. LE NTTP  Neuro: A&O X3.   Residual left weakness and muscle strength in the upper extremity 4 out of 5 from prior stroke, unchanged        Assessment and plan    Acute blood loss anemia, post surgical  History of Fe def  -Status post 3 units packed RBCs with improvement  -Iron studies OK, continue PO iron  -B12, Folate ok  -Monitor CBC    Hyponatremia: Appears chronic on chart review and patient at recent baseline.  -Monitor       HTN  Hold nephrotoxic agents  H/o CVa  Resume ASA when ok with surgery   DVT ppx

## 2021-09-09 NOTE — PROGRESS NOTES
[facility] Hospital Day: 3    Patient doing fairly well  Vitals:    21 0414   BP: (!) 122/54   Pulse: 97   Resp: 18   Temp: 98.4 °F (36.9 °C)   SpO2: 97%      Temp (24hrs), Av.6 °F (37 °C), Min:98.4 °F (36.9 °C), Max:99 °F (37.2 °C)       Pain Control Fair  No chest pain or shortness of breath. No calf pain    Exam:   Pale appearing  Extremity shows neuro vascular status intact. Flexion and extension intact on extremity. Calves soft and non-tender without evidence of DVT. Immobilizer in place      Labs reviewed:  CBC:   Recent Labs     21  0606 21  0606 21  1035 21  1035 21  0826 21  1435 21  2106   WBC 6.3  --  6.5  --  7.9  --   --    HGB 8.4*   < > 8.5*   < > 4.8* 6.8* 8.9*     --  188  --  147  --   --     < > = values in this interval not displayed. BMP:    Recent Labs     21  1215 21  0606 21  0826   * 131* 131*   K 4.0 4.8 4.3   CL 92* 98 98   CO2 29 24 23   BUN 12 23 30*   CREATININE 0.52 0.68 0.75   GLUCOSE 106* 131* 122*       I&O  I/O last 3 completed shifts: In: 1348.3 [P.O.:300; Blood:1048.3]  Out: 1200 [Urine:1200]      Assessment:  Stable. Post op, blood loss anemia.   Morning h/h pending    Plan:  Check h/h  Continue rx  rehab    Steve FridayMD GUERRA  2021 7:08 AM

## 2021-09-09 NOTE — PROGRESS NOTES
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 Fredna Oas  Fall from standing, initial encounter [W19. XXXA]   Chief Complaint   Patient presents with    Fall     Patient Active Problem List    Diagnosis Date Noted    Closed displaced comminuted fracture of shaft of left femur with malunion 2021    [facility]        History reviewed. No pertinent past medical history. Past Surgical History:   Procedure Laterality Date    FEMUR FRACTURE SURGERY Left 2021    OPEN REDUCTION INTERNAL FIXATION LEFT FEMUR FRACTURE performed by León Taylor MD at [address] Right 2019    HIP OPEN REDUCTION INTERNAL FIXATION performed by Hina Amaro MD at [address] [facility]  Restrictions/Precautions: Fall Risk, Weight Bearing  Lower Extremity Weight Bearing Restrictions  Left Lower Extremity Weight Bearing: Non Weight Bearing  Required Braces or Orthoses  Left Lower Extremity Brace: Knee Immobilizer  Position Activity Restriction  Other position/activity restrictions: No rom knee; knee immobilizer at all times with the exception of skin care and hygiene     Safety Devices: Safety Devices  Safety Devices in place: Yes  Type of devices:  All fall risk precautions in place        Subjective  Pre Treatment Pain Screening  Pain at present: 4  Scale Used: Numeric Score  Intervention List: Patient able to continue with treatment, Patient declined any intervention    Pain Reassessment:   Pain Assessment  Patient Currently in Pain: Yes  Pain Assessment: 0-10  Pain Level: 4  Pain Type: Acute pain  Pain Location: Leg, Hip  Pain Orientation: Left  Pain Descriptors: Aching, Sore       Prior Level of Function:  Social/Functional History  Lives With: Alone  Type of Home: Assisted living  Home Layout: One level  Home Access: Level entry  Bathroom Shower/Tub:  (Sponge bathes at bed level)  Home Equipment: [employer] appx (rollator)  Receives Help From: Personal care attendant  ADL Assistance: Needs assistance  Homemaking Assistance: Needs assistance  Homemaking Responsibilities: No  Ambulation Assistance: Independent (rollator)  Transfer Assistance: Independent  Active : No    OBJECTIVE:     Orientation Status:  Orientation  Overall Orientation Status: Impaired  Orientation Level: Oriented to person, Disoriented to time, Disoriented to situation, Disoriented to place    Observation:  Observation/Palpation  Posture: Fair  Observation: pleasant, cooperative, lethargic but improves once seated EOB, L LE bandage intact, knee immobilizer donned    Cognition Status:  Cognition  Overall Cognitive Status: Exceptions  Arousal/Alertness: Delayed responses to stimuli  Following Commands:  Follows one step commands with increased time, Follows one step commands with repetition  Attention Span: Difficulty attending to directions, Difficulty dividing attention  Memory: Decreased recall of recent events, Decreased short term memory, Decreased long term memory, Decreased recall of precautions  Safety Judgement: Decreased awareness of need for assistance, Decreased awareness of need for safety  Problem Solving: Assistance required to generate solutions, Assistance required to identify errors made, Assistance required to implement solutions, Assistance required to correct errors made, Decreased awareness of errors  Insights: Decreased awareness of deficits  Initiation: Requires cues for some  Sequencing: Requires cues for some  Cognition Comment: Pt very lethargic, increased time and IADLs, Decreased cognition, Decreased endurance, Decreased fine motor control, Decreased coordination, Decreased balance  Prognosis: Good  Discharge Recommendations: Continue to assess pending progress  Decision Making: Medium Complexity  History: Pt's medical history is moderately complex  Exam: Pt. has 10 performance deficits  Assistance / Modification: Pt requires max A    Six Click Score   How much help for putting on and taking off regular lower body clothing?: Total  How much help for Bathing?: A Lot  How much help for Toileting?: Total  How much help for putting on and taking off regular upper body clothing?: A Lot  How much help for taking care of personal grooming?: A Little  How much help for eating meals?: A Little  AM-[facility] Inpatient Daily Activity Raw Score: 12  AM-PAC Inpatient ADL T-Scale Score : 30.6  ADL Inpatient CMS 0-100% Score: 66.57    Plan:  Plan  Times per week: 1-4x  Plan weeks: Length of acute stay  Current Treatment Recommendations: Strengthening, Balance Training, Functional Mobility Training, Endurance Training, Pain Management, Safety Education & Training, Patient/Caregiver Education & Training, Equipment Evaluation, Education, & procurement, Self-Care / ADL, Home Management Training, Cognitive/Perceptual Training    Goals:   Patient will:    - Improve functional endurance to tolerate/complete 30 mins of ADL's  - Be Setup in UB ADLs   - Be Min A in LB ADLs  - Be Mo dA in ADL transfers without LOB  - Be Mod A in toileting tasks  - Improve B UE strength and endurance to 3+/5 in order to participate in self-care activities as projected. - Access appropriate D/C site with as few architectural barriers as possible.   - Sequence self-care tasks with no verbal cues for NWB    Patient Goal: Patient goals : \"I want to get home\"     Discussed and agreed upon: Yes Comments:     Therapy Time:   OT Individual Minutes  Time In: 1052  Time Out: 1107  Minutes: 15    Eval: 15 minutes     Electronically signed by:     Anette Charles, OTR/L, OTR/L  9/9/2021, 3:04 PM

## 2021-09-09 NOTE — FLOWSHEET NOTE
pt has not had any urine output since removing alamo at 10am but she has been asleep most of the  and had only about 500ml oral intake of fluid. I bladder scanned her and it only showed 128ml.  I notified dr Liam Sanches via perfect serve, aaiting response at this time

## 2021-09-09 NOTE — CARE COORDINATION
LSW received call from Napoleon Salamanca [facility] @ [phone] requesting an update on pt status. LSW provided update noting pt required 2 units of blood yesterday. Pt will need PT/OT prior to DC. [facility] does have a skilled bed available for pt once medically clear.

## 2021-09-10 VITALS
BODY MASS INDEX: 21.99 KG/M2 | HEIGHT: 60 IN | DIASTOLIC BLOOD PRESSURE: 54 MMHG | RESPIRATION RATE: 18 BRPM | HEART RATE: 98 BPM | OXYGEN SATURATION: 99 % | WEIGHT: 112 LBS | SYSTOLIC BLOOD PRESSURE: 123 MMHG | TEMPERATURE: 98 F

## 2021-09-10 LAB — SARS-COV-2, NAAT: NOT DETECTED

## 2021-09-10 PROCEDURE — 97535 SELF CARE MNGMENT TRAINING: CPT

## 2021-09-10 PROCEDURE — 6370000000 HC RX 637 (ALT 250 FOR IP): Performed by: INTERNAL MEDICINE

## 2021-09-10 PROCEDURE — 2700000000 HC OXYGEN THERAPY PER DAY

## 2021-09-10 PROCEDURE — 6370000000 HC RX 637 (ALT 250 FOR IP): Performed by: ORTHOPAEDIC SURGERY

## 2021-09-10 PROCEDURE — 6370000000 HC RX 637 (ALT 250 FOR IP): Performed by: PHYSICIAN ASSISTANT

## 2021-09-10 PROCEDURE — 87635 SARS-COV-2 COVID-19 AMP PRB: CPT

## 2021-09-10 PROCEDURE — 6370000000 HC RX 637 (ALT 250 FOR IP): Performed by: NURSE PRACTITIONER

## 2021-09-10 PROCEDURE — 6360000002 HC RX W HCPCS: Performed by: ORTHOPAEDIC SURGERY

## 2021-09-10 PROCEDURE — 97110 THERAPEUTIC EXERCISES: CPT

## 2021-09-10 PROCEDURE — 2580000003 HC RX 258: Performed by: ORTHOPAEDIC SURGERY

## 2021-09-10 RX ORDER — OXYCODONE HYDROCHLORIDE 5 MG/1
5 TABLET ORAL EVERY 4 HOURS PRN
Qty: 20 TABLET | Refills: 0 | Status: SHIPPED | OUTPATIENT
Start: 2021-09-10 | End: 2021-09-13

## 2021-09-10 RX ADMIN — LABETALOL HYDROCHLORIDE 200 MG: 200 TABLET, FILM COATED ORAL at 20:53

## 2021-09-10 RX ADMIN — LEVOTHYROXINE SODIUM 100 MCG: 100 TABLET ORAL at 06:25

## 2021-09-10 RX ADMIN — DORZOLAMIDE HYDROCHLORIDE 1 DROP: 20 SOLUTION/ DROPS OPHTHALMIC at 08:31

## 2021-09-10 RX ADMIN — DORZOLAMIDE HYDROCHLORIDE 1 DROP: 20 SOLUTION/ DROPS OPHTHALMIC at 20:57

## 2021-09-10 RX ADMIN — OXYCODONE HYDROCHLORIDE AND ACETAMINOPHEN 500 MG: 500 TABLET ORAL at 20:53

## 2021-09-10 RX ADMIN — Medication 400 UNITS: at 08:31

## 2021-09-10 RX ADMIN — ATORVASTATIN CALCIUM 10 MG: 10 TABLET, FILM COATED ORAL at 08:31

## 2021-09-10 RX ADMIN — OXYCODONE HYDROCHLORIDE AND ACETAMINOPHEN 500 MG: 500 TABLET ORAL at 08:31

## 2021-09-10 RX ADMIN — POLYETHYLENE GLYCOL 3350 12 G: 17 POWDER, FOR SOLUTION ORAL at 08:30

## 2021-09-10 RX ADMIN — Medication 150 MG: at 08:31

## 2021-09-10 RX ADMIN — LATANOPROST 1 DROP: 50 SOLUTION OPHTHALMIC at 08:31

## 2021-09-10 RX ADMIN — LABETALOL HYDROCHLORIDE 200 MG: 200 TABLET, FILM COATED ORAL at 08:31

## 2021-09-10 RX ADMIN — ENOXAPARIN SODIUM 40 MG: 40 INJECTION SUBCUTANEOUS at 08:30

## 2021-09-10 RX ADMIN — CYANOCOBALAMIN TAB 500 MCG 500 MCG: 500 TAB at 08:31

## 2021-09-10 RX ADMIN — DOCUSATE SODIUM 50 MG AND SENNOSIDES 8.6 MG 1 TABLET: 8.6; 5 TABLET, FILM COATED ORAL at 08:31

## 2021-09-10 RX ADMIN — DOCUSATE SODIUM 50 MG AND SENNOSIDES 8.6 MG 1 TABLET: 8.6; 5 TABLET, FILM COATED ORAL at 20:53

## 2021-09-10 RX ADMIN — SODIUM CHLORIDE, PRESERVATIVE FREE 10 ML: 5 INJECTION INTRAVENOUS at 08:32

## 2021-09-10 ASSESSMENT — PAIN DESCRIPTION - PAIN TYPE: TYPE: ACUTE PAIN

## 2021-09-10 ASSESSMENT — PAIN SCALES - GENERAL
PAINLEVEL_OUTOF10: 0
PAINLEVEL_OUTOF10: 1
PAINLEVEL_OUTOF10: 0
PAINLEVEL_OUTOF10: 0

## 2021-09-10 ASSESSMENT — PAIN DESCRIPTION - LOCATION: LOCATION: HIP;LEG

## 2021-09-10 ASSESSMENT — PAIN DESCRIPTION - ORIENTATION: ORIENTATION: LEFT

## 2021-09-10 ASSESSMENT — PAIN SCALES - WONG BAKER
WONGBAKER_NUMERICALRESPONSE: 0
WONGBAKER_NUMERICALRESPONSE: 2

## 2021-09-10 NOTE — PROGRESS NOTES
DAILY PROGRESS NOTE      POD: 3    Patient doing well  Vitals:    09/10/21 0424   BP: (!) 115/52   Pulse: 97   Resp:    Temp:    SpO2: 99%      Temp (24hrs), Av.5 °F (36.9 °C), Min:98.1 °F (36.7 °C), Max:98.7 °F (37.1 °C)       Pain Control Good  No chest pain or shortness of breath. No calf pain    Exam:   Incision(s): clean  Dressing clean, dry, and intact  Operative extremity shows neuro vascular status intact at baseline. Flexion and extension not intact on operative extremity due to prior stroke. Calves soft and non-tender without evidence of DVT. .      Labs reviewed:  CBC:   Recent Labs     21  1035 21  1035 21  0826 21  0826 21  1435 21  2106 21  0946   WBC 6.5  --  7.9  --   --   --  8.8   HGB 8.5*   < > 4.8*   < > 6.8* 8.9* 8.5*     --  147  --   --   --  135    < > = values in this interval not displayed. BMP:    Recent Labs     21  0821  0946   * 130*   K 4.3 4.2   CL 98 99   CO2 23 22   BUN 30* 22   CREATININE 0.75 0.49*   GLUCOSE 122* 157*       I&O  No intake/output data recorded. Assessment:  Good Post Operative Course. Plan:  D/c to rehab.      Norberto Javed MD MD  9/10/2021 7:06 AM

## 2021-09-10 NOTE — PROGRESS NOTES
Interval:  No acute events overnight. Pain is well controlled. ROS: 12 point review systems negative except was stated in HPI  History reviewed. No pertinent past medical history. Past Surgical History:   Procedure Laterality Date    FEMUR FRACTURE SURGERY Left 9/7/2021    OPEN REDUCTION INTERNAL FIXATION LEFT FEMUR FRACTURE performed by Carl Goff MD at [address] Right 9/5/2019    HIP OPEN REDUCTION INTERNAL FIXATION performed by Ivanna Carmen MD at [address] Marital status:      Spouse name: Not on file    Number of children: Not on file    Years of education: Not on file    Highest education level: Not on file   Occupational History    Not on file   Tobacco Use    Smoking status: Never Smoker    Smokeless tobacco: Never Used   Vaping Use    Vaping Use: Never used   Substance and Sexual Activity    Alcohol use: Not on file    Drug use: Not on file    Sexual activity: Not on file   Other Topics Concern    Not on file   Social History Narrative    Not on file     Social Determinants of Health     Financial Resource Strain:     Difficulty of Paying Living Expenses:    Food Insecurity:     Worried About Running Out of Food in the Last Year:     [address] in the Last Year:    Transportation Needs:     Lack of Transportation (Medical):      Lack of Transportation (Non-Medical):    Physical Activity:     Days of Exercise per Week:     Minutes of Exercise per Session:    Stress:     Feeling of Stress :    Social Connections:     Frequency of Communication with Friends and Family:     Frequency of Social Gatherings with Friends and Family:     Attends Muslim Services:     Active Member of Clubs or Organizations:     Attends Club or Organization Meetings:     Marital Status:    Intimate Partner Violence:     Fear of Current or Ex-Partner:     Emotionally Abused:     Physically Abused:     with improvement  -Iron studies OK, continue PO iron  -B12, Folate ok  -Monitor CBC    Hyponatremia: Appears chronic on chart review and patient at recent baseline.  -Monitor       HTN  Hold nephrotoxic agents  H/o CVa  Resume ASA when ok with surgery   DVT ppx    Courage incentive spirometry and p.o. fluid intake.   Stable from medicine standpoint for discharge to rehab

## 2021-09-10 NOTE — PROGRESS NOTES
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 [facility]  Fall from standing, initial encounter [W19. XXXA]   Chief Complaint   Patient presents with    Fall     Patient Active Problem List    Diagnosis Date Noted    Closed displaced comminuted fracture of shaft of left femur with malunion 2021    Hip fracture requiring operative repair, right, closed, initial encounter ([facility] 2019        History reviewed. No pertinent past medical history. Past Surgical History:   Procedure Laterality Date    FEMUR FRACTURE SURGERY Left 2021    OPEN REDUCTION INTERNAL FIXATION LEFT FEMUR FRACTURE performed by Leno Ruiz MD at [address] [place] [address] Right 2019    HIP OPEN REDUCTION INTERNAL FIXATION performed by Zac Cheng MD at [address] [place] [address]  Restrictions/Precautions: Fall Risk;Weight Bearing  Lower Extremity Weight Bearing Restrictions  Left Lower Extremity Weight Bearing: Non Weight Bearing  Required Braces or Orthoses  Left Lower Extremity Brace: Knee Immobilizer  Position Activity Restriction  Other position/activity restrictions: No rom knee; knee immobilizer at all times with the exception of skin care and hygiene    SUBJECTIVE   General  Chart Reviewed: Yes  Family / Caregiver Present: No  Subjective  Subjective: \"I guess I could try to get moving, you can move my leg but I can't. \"    Pre-Session Pain Report  Pre Treatment Pain Screening  Pain at present: 0  Intervention List: Patient able to continue with treatment  Comments / Details: Pt reports no pain when supine.   Pain Screening  Patient Currently in Pain: Denies       Post-Session Pain Report  Pain Assessment  Pain Level: 0         OBJECTIVE        Bed mobility  Rolling to Right: 2 Person assistance;Maximum assistance  Supine to Sit: Dependent/Total;2 Person assistance  Sit to Supine: Dependent/Total;2 Person assistance  Scooting: Dependent/Total;2 Person assistance  Comment: HOB slightly elevated during supine > sit, HOB flat during sit > supine. No HR's utilized d/t dependent assistance with trunk and BLE during all transitions. Transfers  Sit to Stand: 2 Person Assistance;Maximum Assistance  Stand to sit: 2 Person Assistance;Maximum Assistance  Comment: Slight initiation for fwd weight shift. Placement of foot under pt's LLE to maintain NWB status. Pt able to perform full stand despite +2 dependent assistance. VC's for technique and safety. Exercises  Ankle Pumps: x10 RLE, PROM on LLE  Core Strengthening: Lateral leans, trunk flx, ext  Neurodevelopmental Techniques: functional reaching in all planes with no UE support        ASSESSMENT   Assessment: Pt self limiting and displays poor follow through of vc/tc's for improving technique of all tasks. Pt able to recall NWB status on LLE. Trialed STS this session, pt unable to activate glutes to perform full upright stand despite vc/tc's. Increased time needed throughout tx for step by step instruction for all tasks and RB's d/t pt apprehensive to all movements secondary to fear of falling.      Discharge Recommendations:  Continue to assess pending progress, Patient would benefit from continued therapy after discharge    Goals  Long term goals  Long term goal 1: Bed mobility with ModA  Long term goal 2: Functional transfers with Max A to promote OOB activity  Long term goal 3: sitting unsupported EOB x10 min with SBA  Long term goal 4: Functional reach in unsupported sitting >6 in OOBOS with ability to return to midline  Long term goal 5: Pt will recall NWB status and maintain during mobility with <25% VC  Patient Goals   Patient goals : \"go home\"    PLAN    Times per week: 5-7  Times per day:  (1-2)  Plan Comment: Cont. POC  Safety Devices  Type of devices: All fall risk precautions in place, Bed alarm in place, Call light within reach, Left in bed     [facility] (6 CLICK) Chris 95 Raw Score : 8     Therapy Time   Individual   Time In 0855   Time Out 0918   Minutes 23      BM/Trsf: 15  There ex: Grover 200, PTA, 09/10/21 at 9:51 AM         Definitions for assistance levels  Independent = pt does not require any physical supervision or assistance from another person for activity completion. Device may be needed.   Stand by assistance = pt requires verbal cues or instructions from another person, close to but not touching, to perform the activity  Minimal assistance= pt performs 75% or more of the activity; assistance is required to complete the activity  Moderate assistance= pt performs 50% of the activity; assistance is required to complete the activity  Maximal assistance = pt performs 25% of the activity; assistance is required to complete the activity  Dependent = pt requires total physical assistance to accomplish the task

## 2021-09-10 NOTE — CARE COORDINATION
LSW left VM message for Josefina Odonnell noting Graciela/Ewa had just spoke with her to arrange DC. It was noted [employer] voiced she was not aware of pt plan. LSW stated per notes on 9/9 Светлана Sarah was aware and did have a SNF available for pt. LSW requested a return call. 12:56 LSW called again to [facility]. Pt will DC today. Report can be called to [phone].   LSW updated nursing

## 2021-09-10 NOTE — PROGRESS NOTES
DC orders . Attempted to call report to [facility] RN at the facility states that Nurse manager gets report on patient and she was going to put me on hold. Waited time 15 min to get hold of RN manager of the facility. Unable to complete report, will atempt again. 1650 PM Report called to [facility]. Pt remained stable during the shift, alert and oriented x2 with intermittent confusion, although denied pain, ice pack applied to the surgical site, pt turned and repositioned. IV removed, DC instruction and patient hospital course of treatment information printed.

## 2021-09-10 NOTE — DISCHARGE SUMMARY
Discharge summary  This patient Shawna Laguna was admitted to the hospital on 9/6/2021  after undergoing Procedure(s) (LRB):  OPEN REDUCTION INTERNAL FIXATION LEFT FEMUR FRACTURE (Left) without complications that morning. During the postoperative period,while in hospital, patient was medically managed by the hospitalist. Please see medial notes and H&P for patients additional diagnoses. Ortho agrees with all medical diagnoses and treatments while patient in hospital.  No significant or unexpected findings or abnormal ortho imaging were noted during the hospital stay    Hospital course  Patient tolerated surgical procedure well and there was no complications. Patient progressed adequtly through their recovery during hospital stay including PT and rehabilitation. DVT prophylaxis was implemented POD#1  Patient was then D/C on  to Skilled nursing facility at [facility]  in stable condition. Patient was instructed on the use of pain medications, the signs and symptoms of infection, and was given our number to call should they have any questions or concerns following discharge. Patient experienced acute blood loss anemia secondary to femur fracture and surgery. During her hospital stay she required two units of blood and current hgb is 8.5. She remained hemodynamically stable not requiring further intervention. Patient is to be non weight bearing to operative extremity with no ROM. She is to wear knee immobilizer at all times with the exception of skin care and hygiene. She will require SNF placement at discharge and not be able to return to assisted living until able to safely ambulate and perform ADL's once weight bearing restriction successfully completely given the extent of injury and surgery. She is to follow up with surgeon in two weeks. FLUORO FOR SURGICAL PROCEDURES : 9/7/2021 1:31 PM       CLINICAL HISTORY: R52 Pain ICD10.  Open reduction internal fixation of femur       COMPARISON: None available.       Intraoperative fluoroscopy was provided for Dr. Nawaf Lutz 's procedure.       A total of 5.73 mGy of fluoroscopy was used, with 14 fluoroscopic stills saved.  As       No diagnostic images were obtained.           Please see Dr. Daisy Mccall surgical notes for complete details.

## 2021-09-11 NOTE — FLOWSHEET NOTE
Hygiene provided before patient was discharged as well as HS medication given. [facility] arrived at about 2105. Dtr followed.

## 2021-09-12 ENCOUNTER — APPOINTMENT (OUTPATIENT)
Dept: CT IMAGING | Age: 86
End: 2021-09-12
Payer: MEDICARE

## 2021-09-12 ENCOUNTER — HOSPITAL ENCOUNTER (EMERGENCY)
Age: 86
Discharge: HOME | End: 2021-09-13
Attending: EMERGENCY MEDICINE
Payer: MEDICARE

## 2021-09-12 DIAGNOSIS — D64.9 ANEMIA, UNSPECIFIED TYPE: Primary | ICD-10-CM

## 2021-09-12 LAB
ABO/RH: NORMAL
ANION GAP SERPL CALCULATED.3IONS-SCNC: 6 MEQ/L (ref 9–15)
ANTIBODY SCREEN: NORMAL
BASOPHILS ABSOLUTE: 0 K/UL (ref 0–0.2)
BASOPHILS RELATIVE PERCENT: 0.2 %
BUN BLDV-MCNC: 28 MG/DL (ref 8–23)
CALCIUM SERPL-MCNC: 7.6 MG/DL (ref 8.5–9.9)
CHLORIDE BLD-SCNC: 101 MEQ/L (ref 95–107)
CO2: 26 MEQ/L (ref 20–31)
CREAT SERPL-MCNC: 0.57 MG/DL (ref 0.5–0.9)
EOSINOPHILS ABSOLUTE: 0.4 K/UL (ref 0–0.7)
EOSINOPHILS RELATIVE PERCENT: 4.9 %
GFR AFRICAN AMERICAN: >60
GFR NON-AFRICAN AMERICAN: >60
GLUCOSE BLD-MCNC: 101 MG/DL (ref 70–99)
HCT VFR BLD CALC: 19.2 % (ref 37–47)
HEMOGLOBIN: 6.6 G/DL (ref 12–16)
LYMPHOCYTES ABSOLUTE: 0.6 K/UL (ref 1–4.8)
LYMPHOCYTES RELATIVE PERCENT: 7.5 %
MCH RBC QN AUTO: 31.4 PG (ref 27–31.3)
MCHC RBC AUTO-ENTMCNC: 34.2 % (ref 33–37)
MCV RBC AUTO: 91.8 FL (ref 82–100)
MONOCYTES ABSOLUTE: 0.8 K/UL (ref 0.2–0.8)
MONOCYTES RELATIVE PERCENT: 10.2 %
NEUTROPHILS ABSOLUTE: 6.4 K/UL (ref 1.4–6.5)
NEUTROPHILS RELATIVE PERCENT: 77.2 %
PDW BLD-RTO: 17.8 % (ref 11.5–14.5)
PLATELET # BLD: 223 K/UL (ref 130–400)
POTASSIUM SERPL-SCNC: 4.6 MEQ/L (ref 3.4–4.9)
RBC # BLD: 2.1 M/UL (ref 4.2–5.4)
SODIUM BLD-SCNC: 133 MEQ/L (ref 135–144)
WBC # BLD: 8.3 K/UL (ref 4.8–10.8)

## 2021-09-12 PROCEDURE — 73701 CT LOWER EXTREMITY W/DYE: CPT

## 2021-09-12 PROCEDURE — 80048 BASIC METABOLIC PNL TOTAL CA: CPT

## 2021-09-12 PROCEDURE — 36415 COLL VENOUS BLD VENIPUNCTURE: CPT

## 2021-09-12 PROCEDURE — 6360000004 HC RX CONTRAST MEDICATION: Performed by: EMERGENCY MEDICINE

## 2021-09-12 PROCEDURE — 86850 RBC ANTIBODY SCREEN: CPT

## 2021-09-12 PROCEDURE — 86901 BLOOD TYPING SEROLOGIC RH(D): CPT

## 2021-09-12 PROCEDURE — 86900 BLOOD TYPING SEROLOGIC ABO: CPT

## 2021-09-12 PROCEDURE — 86923 COMPATIBILITY TEST ELECTRIC: CPT

## 2021-09-12 PROCEDURE — 85025 COMPLETE CBC W/AUTO DIFF WBC: CPT

## 2021-09-12 PROCEDURE — P9016 RBC LEUKOCYTES REDUCED: HCPCS

## 2021-09-12 RX ORDER — SODIUM CHLORIDE 9 MG/ML
INJECTION, SOLUTION INTRAVENOUS PRN
Status: DISCONTINUED | OUTPATIENT
Start: 2021-09-12 | End: 2021-09-13 | Stop reason: HOSPADM

## 2021-09-12 RX ORDER — SODIUM CHLORIDE 9 MG/ML
INJECTION, SOLUTION INTRAVENOUS
Status: DISCONTINUED
Start: 2021-09-12 | End: 2021-09-13 | Stop reason: HOSPADM

## 2021-09-12 RX ADMIN — IOPAMIDOL 100 ML: 755 INJECTION, SOLUTION INTRAVENOUS at 23:19

## 2021-09-12 NOTE — PROGRESS NOTES
Physical Therapy  Facility/Department: [facility] N229/N229-01  Physical Therapy Discharge      NAME: Delaney Brumfield    : 1928 (80 y.o.)  MRN: [id number]    Account: [de-identified]  Gender: female      Patient has been discharged from acute care hospital. DC patient from current PT program.      Electronically signed by Brennen Alba PT on 21 at 10:24 AM EDT

## 2021-09-13 ENCOUNTER — APPOINTMENT (OUTPATIENT)
Dept: ULTRASOUND IMAGING | Age: 86
End: 2021-09-13
Payer: MEDICARE

## 2021-09-13 ENCOUNTER — HOSPITAL ENCOUNTER (EMERGENCY)
Age: 86
Discharge: ANOTHER HOSPITAL | End: 2021-09-13
Attending: EMERGENCY MEDICINE
Payer: MEDICARE

## 2021-09-13 VITALS
HEART RATE: 92 BPM | OXYGEN SATURATION: 96 % | WEIGHT: 130 LBS | SYSTOLIC BLOOD PRESSURE: 148 MMHG | HEIGHT: 60 IN | DIASTOLIC BLOOD PRESSURE: 62 MMHG | TEMPERATURE: 99.4 F | RESPIRATION RATE: 29 BRPM | BODY MASS INDEX: 25.52 KG/M2

## 2021-09-13 VITALS
RESPIRATION RATE: 16 BRPM | DIASTOLIC BLOOD PRESSURE: 56 MMHG | SYSTOLIC BLOOD PRESSURE: 117 MMHG | TEMPERATURE: 98 F | HEART RATE: 84 BPM | WEIGHT: 112 LBS | BODY MASS INDEX: 21.87 KG/M2 | OXYGEN SATURATION: 97 %

## 2021-09-13 DIAGNOSIS — S85.002A POPLITEAL ARTERY INJURY, LEFT, INITIAL ENCOUNTER: Primary | ICD-10-CM

## 2021-09-13 DIAGNOSIS — R20.9 COLD LEFT FOOT: ICD-10-CM

## 2021-09-13 LAB
ALBUMIN SERPL-MCNC: 2.4 G/DL (ref 3.5–4.6)
ALP BLD-CCNC: 57 U/L (ref 40–130)
ALT SERPL-CCNC: 53 U/L (ref 0–33)
ANION GAP SERPL CALCULATED.3IONS-SCNC: 7 MEQ/L (ref 9–15)
AST SERPL-CCNC: 68 U/L (ref 0–35)
BILIRUB SERPL-MCNC: 1.2 MG/DL (ref 0.2–0.7)
BLOOD BANK DISPENSE STATUS: NORMAL
BLOOD BANK DISPENSE STATUS: NORMAL
BLOOD BANK PRODUCT CODE: NORMAL
BLOOD BANK PRODUCT CODE: NORMAL
BPU ID: NORMAL
BPU ID: NORMAL
BUN BLDV-MCNC: 20 MG/DL (ref 8–23)
CALCIUM SERPL-MCNC: 8 MG/DL (ref 8.5–9.9)
CHLORIDE BLD-SCNC: 102 MEQ/L (ref 95–107)
CO2: 26 MEQ/L (ref 20–31)
CREAT SERPL-MCNC: 0.4 MG/DL (ref 0.5–0.9)
DESCRIPTION BLOOD BANK: NORMAL
DESCRIPTION BLOOD BANK: NORMAL
GFR AFRICAN AMERICAN: >60
GFR NON-AFRICAN AMERICAN: >60
GLOBULIN: 2.4 G/DL (ref 2.3–3.5)
GLUCOSE BLD-MCNC: 118 MG/DL (ref 70–99)
HCT VFR BLD CALC: 28.6 % (ref 37–47)
HCT VFR BLD CALC: 34 % (ref 37–47)
HEMOGLOBIN: 11.5 G/DL (ref 12–16)
HEMOGLOBIN: 9.6 G/DL (ref 12–16)
INR BLD: 1.1
MCH RBC QN AUTO: 30.5 PG (ref 27–31.3)
MCHC RBC AUTO-ENTMCNC: 33.8 % (ref 33–37)
MCV RBC AUTO: 90.4 FL (ref 82–100)
PDW BLD-RTO: 16.8 % (ref 11.5–14.5)
PLATELET # BLD: 259 K/UL (ref 130–400)
POTASSIUM SERPL-SCNC: 4.4 MEQ/L (ref 3.4–4.9)
PROTHROMBIN TIME: 14.5 SEC (ref 12.3–14.9)
RBC # BLD: 3.76 M/UL (ref 4.2–5.4)
SODIUM BLD-SCNC: 135 MEQ/L (ref 135–144)
TOTAL PROTEIN: 4.8 G/DL (ref 6.3–8)
WBC # BLD: 10.2 K/UL (ref 4.8–10.8)

## 2021-09-13 PROCEDURE — 36415 COLL VENOUS BLD VENIPUNCTURE: CPT

## 2021-09-13 PROCEDURE — 85014 HEMATOCRIT: CPT

## 2021-09-13 PROCEDURE — 85027 COMPLETE CBC AUTOMATED: CPT

## 2021-09-13 PROCEDURE — 99285 EMERGENCY DEPT VISIT HI MDM: CPT

## 2021-09-13 PROCEDURE — 93926 LOWER EXTREMITY STUDY: CPT

## 2021-09-13 PROCEDURE — 36430 TRANSFUSION BLD/BLD COMPNT: CPT

## 2021-09-13 PROCEDURE — 85018 HEMOGLOBIN: CPT

## 2021-09-13 PROCEDURE — 80053 COMPREHEN METABOLIC PANEL: CPT

## 2021-09-13 PROCEDURE — 93005 ELECTROCARDIOGRAM TRACING: CPT | Performed by: EMERGENCY MEDICINE

## 2021-09-13 PROCEDURE — 85610 PROTHROMBIN TIME: CPT

## 2021-09-13 PROCEDURE — P9016 RBC LEUKOCYTES REDUCED: HCPCS

## 2021-09-13 RX ORDER — HEPARIN SODIUM 1000 [USP'U]/ML
30 INJECTION, SOLUTION INTRAVENOUS; SUBCUTANEOUS PRN
Status: DISCONTINUED | OUTPATIENT
Start: 2021-09-13 | End: 2021-09-14 | Stop reason: HOSPADM

## 2021-09-13 RX ORDER — HEPARIN SODIUM 1000 [USP'U]/ML
60 INJECTION, SOLUTION INTRAVENOUS; SUBCUTANEOUS PRN
Status: DISCONTINUED | OUTPATIENT
Start: 2021-09-13 | End: 2021-09-14 | Stop reason: HOSPADM

## 2021-09-13 RX ORDER — HEPARIN SODIUM 10000 [USP'U]/100ML
5-30 INJECTION, SOLUTION INTRAVENOUS CONTINUOUS
Status: DISCONTINUED | OUTPATIENT
Start: 2021-09-13 | End: 2021-09-14 | Stop reason: HOSPADM

## 2021-09-13 RX ORDER — HEPARIN SODIUM 1000 [USP'U]/ML
60 INJECTION, SOLUTION INTRAVENOUS; SUBCUTANEOUS ONCE
Status: DISCONTINUED | OUTPATIENT
Start: 2021-09-13 | End: 2021-09-14 | Stop reason: HOSPADM

## 2021-09-13 ASSESSMENT — ENCOUNTER SYMPTOMS
EYE DISCHARGE: 0
PHOTOPHOBIA: 0
RHINORRHEA: 0
ABDOMINAL DISTENTION: 0
SHORTNESS OF BREATH: 0
ABDOMINAL PAIN: 0
WHEEZING: 0
FACIAL SWELLING: 0
NAUSEA: 0
COLOR CHANGE: 0
VOMITING: 0

## 2021-09-13 NOTE — ED PROVIDER NOTES
[address] [facility] ED  EMERGENCY DEPARTMENT ENCOUNTER      Pt Name: Noel Guzman  MRN: [id number]  Patricia 12/4/1928  Date of evaluation: 9/12/2021  Provider: Oliver Bellamy MD    CHIEF COMPLAINT       Chief Complaint   Patient presents with    Other     abnormal labs         HISTORY OF PRESENT ILLNESS   (Location/Symptom, Timing/Onset, Context/Setting, Quality, Duration, Modifying Factors, Severity)  Note limiting factors. 80-year-old female presenting for blood transfusion. Patient has had downward trending hemoglobin. Last hemoglobin was 7.3. Patient notes no symptoms. She specifically denies dizziness, chest pain or shortness of breath. She has had a blood transfusion recently associated with her broken femur. Notes no pain or increased swelling with the left leg operation. [facility] but family requesting medical eval and transfusion. Nursing Notes were reviewed. REVIEW OF SYSTEMS    (2-9 systems for level 4, 10 or more for level 5)     Review of Systems   All other systems reviewed and are negative. Except as noted above the remainder of the review of systems was reviewed and negative. PAST MEDICAL HISTORY   No past medical history on file. SURGICAL HISTORY       Past Surgical History:   Procedure Laterality Date    FEMUR FRACTURE SURGERY Left 9/7/2021    OPEN REDUCTION INTERNAL FIXATION LEFT FEMUR FRACTURE performed by Ish Welsh MD at [address] [place] [address] Right 9/5/2019    HIP OPEN REDUCTION INTERNAL FIXATION performed by Senthil Calvert MD at [address] [place] [address]       Current Discharge Medication List      CONTINUE these medications which have NOT CHANGED    Details   oxyCODONE (ROXICODONE) 5 MG immediate release tablet Take 1 tablet by mouth every 4 hours as needed for Pain for up to 3 days.   Qty: 20 tablet, Refills: 0    Comments: Reduce doses taken as pain becomes manageable  Associated Diagnoses: Closed displaced comminuted fracture of shaft of left femur with malunion      iron polysaccharides (NIFEREX) 150 MG capsule Take 1 capsule by mouth daily  Qty: 30 capsule, Refills: 0      senna (SENOKOT) 8.6 MG tablet Take 1 tablet by mouth 2 times daily for 10 days  Qty: 20 tablet, Refills: 0      ascorbic acid (VITAMIN C) 500 MG tablet Take 1 tablet by mouth 2 times daily for 30 doses  Qty: 30 tablet, Refills: 0      lisinopril (PRINIVIL;ZESTRIL) 10 MG tablet Take 10 mg by mouth daily      bisacodyl (DULCOLAX) 10 MG suppository Place 10 mg rectally daily as needed for Constipation      vitamin D3 (CHOLECALCIFEROL) 10 MCG (400 UNIT) TABS tablet Take 400 Units by mouth daily      tolterodine (DETROL LA) 2 MG extended release capsule Take 2 mg by mouth daily      aspirin 325 MG EC tablet Take 325 mg by mouth daily      polyethylene glycol (GLYCOLAX) packet Take 12 g by mouth daily       cyanocobalamin 500 MCG tablet Take 500 mcg by mouth daily      labetalol (NORMODYNE) 100 MG tablet Take 200 mg by mouth 2 times daily       atorvastatin (LIPITOR) 10 MG tablet Take 10 mg by mouth daily      levothyroxine (SYNTHROID) 100 MCG tablet Take 100 mcg by mouth Daily             ALLERGIES     Ibuprofen, Naprosyn [naproxen], Plaquenil [hydroxychloroquine sulfate], and Tylenol [acetaminophen]    FAMILY HISTORY     No family history on file.        SOCIAL HISTORY       Social History     Socioeconomic History    Marital status:      Spouse name: Not on file    Number of children: Not on file    Years of education: Not on file    Highest education level: Not on file   Occupational History    Not on file   Tobacco Use    Smoking status: Never Smoker    Smokeless tobacco: Never Used   Vaping Use    Vaping Use: Never used   Substance and Sexual Activity    Alcohol use: Not on file    Drug use: Not on file    Sexual activity: Not on file   Other Topics Concern    Not on file   Social History Narrative    Not on file     Social Determinants of Health     Financial Resource Strain:     Difficulty of Paying Living Expenses:    Food Insecurity:     Worried About Running Out of Food in the Last Year:     [address] in the Last Year:    Transportation Needs:     Lack of Transportation (Medical):  Lack of Transportation (Non-Medical):    Physical Activity:     Days of Exercise per Week:     Minutes of Exercise per Session:    Stress:     Feeling of Stress :    Social Connections:     Frequency of Communication with Friends and Family:     Frequency of Social Gatherings with Friends and Family:     Attends Sabianism Services:     Active Member of Clubs or Organizations:     Attends Club or Organization Meetings:     Marital Status:    Intimate Partner Violence:     Fear of Current or Ex-Partner:     Emotionally Abused:     Physically Abused:     Sexually Abused:        SCREENINGS    Castor Coma Scale  Eye Opening: Spontaneous  Best Verbal Response: Oriented  Best Motor Response: Obeys commands  Castor Coma Scale Score: 15          PHYSICAL EXAM    (up to 7 for level 4, 8 or more for level 5)     ED Triage Vitals [09/12/21 2115]   BP Temp Temp Source Pulse Resp SpO2 Height Weight   (!) 117/56 98 °F (36.7 °C) Oral 81 16 95 % -- 112 lb (50.8 kg)       Physical Exam  Vitals and nursing note reviewed. Constitutional:       General: She is not in acute distress. Appearance: Normal appearance. She is well-developed. She is not ill-appearing. HENT:      Head: Normocephalic and atraumatic. Mouth/Throat:      Mouth: Mucous membranes are moist.      Pharynx: Oropharynx is clear. Eyes:      Extraocular Movements: Extraocular movements intact. Conjunctiva/sclera: Conjunctivae normal.   Cardiovascular:      Rate and Rhythm: Normal rate and regular rhythm. Pulmonary:      Effort: Pulmonary effort is normal.      Breath sounds: Normal breath sounds.    Abdominal:      General: Bowel sounds are normal.      Palpations: Abdomen is soft.      Tenderness: There is no abdominal tenderness. Genitourinary:     Rectum: Guaiac result negative. Musculoskeletal:         General: No deformity. Normal range of motion. Cervical back: Normal range of motion and neck supple. Skin:     General: Skin is warm and dry. Capillary Refill: Capillary refill takes less than 2 seconds. Neurological:      General: No focal deficit present. Mental Status: She is alert and oriented to person, place, and time. Mental status is at baseline. Cranial Nerves: No cranial nerve deficit. Psychiatric:         Thought Content:  Thought content normal.         DIAGNOSTIC RESULTS     EKG: All EKG's are interpreted by the Emergency Department Physician who either signs or Co-signs this chart in the absence of a cardiologist.    RADIOLOGY:   Non-plain film images such as CT, Ultrasound and MRI are read by the radiologist. Plain radiographic images are visualized and preliminarily interpreted by the emergency physician with the below findings:    Interpretation per the Radiologist below, if available at the time of this note:    CT FEMUR LEFT W CONTRAST    (Results Pending)       LABS:  Labs Reviewed   BASIC METABOLIC PANEL - Abnormal; Notable for the following components:       Result Value    Sodium 133 (*)     Anion Gap 6 (*)     Glucose 101 (*)     BUN 28 (*)     Calcium 7.6 (*)     All other components within normal limits   CBC WITH AUTO DIFFERENTIAL - Abnormal; Notable for the following components:    RBC 2.10 (*)     Hemoglobin 6.6 (*)     Hematocrit 19.2 (*)     MCH 31.4 (*)     RDW 17.8 (*)     Lymphocytes Absolute 0.6 (*)     All other components within normal limits    Narrative:     CALL  [facility]  ED tel. [phone],  H&H  results called to and read back by Starr JOSE, 09/12/2021 22:27, by Anna Lowry   TYPE AND SCREEN   PREPARE RBC (CROSSMATCH)   TYPE AND SCREEN       All other labs were within normal range or not returned as of this

## 2021-09-13 NOTE — ED NOTES
Bed: 24  Expected date: 9/13/21  Expected time: 7:17 PM  Means of arrival:   Comments:  79 y/o [address] [facility], RN  09/13/21 192

## 2021-09-13 NOTE — ED NOTES
Attempt to call Madeline at [place], no answer at [phone] and [address] [facility], RN  09/13/21 7451

## 2021-09-13 NOTE — ED TRIAGE NOTES
Pt arrived to ED via ems from nursing facility. Per ems states that nursing facility stated that pt was confused and not answering questions properly. Per ems pt just had left side hip surgery. Per nursing home pt had no feeling and distal part of leg cool to touch no pulse found. When pt arrived to ed pt is alert and oriented x 2 pt is able to answer some questions appropriately. Pt states she has feeling in right leg/foot but has not feeling in left leg or foot. Pt foot is cool to touch. No pulse detected with doppler. Pt deneis pain besides where she had surgery at.

## 2021-09-14 LAB
EKG ATRIAL RATE: 92 BPM
EKG P AXIS: 22 DEGREES
EKG P-R INTERVAL: 166 MS
EKG Q-T INTERVAL: 368 MS
EKG QRS DURATION: 88 MS
EKG QTC CALCULATION (BAZETT): 455 MS
EKG R AXIS: -17 DEGREES
EKG T AXIS: 175 DEGREES
EKG VENTRICULAR RATE: 92 BPM

## 2021-09-14 PROCEDURE — 93010 ELECTROCARDIOGRAM REPORT: CPT | Performed by: INTERNAL MEDICINE

## 2021-09-14 NOTE — ED PROVIDER NOTES
[facility] ED  eMERGENCY dEPARTMENT eNCOUnter      Pt Name: Kayleigh Eng  MRN: [id number]  [place] 12/4/1928  Date of evaluation: 9/13/2021  Provider: Rell Aguillon, [address] [place] [address]       Chief Complaint   Patient presents with    Other     post op complication          HISTORY OF PRESENT ILLNESS   (Location/Symptom, Timing/Onset,Context/Setting, Quality, Duration, Modifying Factors, Severity)  Note limiting factors. Kayleigh Eng is a 80 y.o. female who presents to the emergency department with a chief complaint of cold left foot with discoloration as noticed at her living facility today. Patient is feeling pain in his lower extremity however she had a recent hip fracture with repair and has been having pain in this lower extremity. She was reported to be increasingly sleepy today and staff found her leg as it was. HPI    NursingNotes were reviewed. REVIEW OF SYSTEMS    (2-9 systems for level 4, 10 or more for level 5)     Review of Systems   Constitutional: Positive for activity change and fatigue. Negative for appetite change. HENT: Negative for congestion, facial swelling and rhinorrhea. Eyes: Negative for photophobia and discharge. Respiratory: Negative for shortness of breath and wheezing. Cardiovascular: Negative for chest pain. Gastrointestinal: Negative for abdominal distention, abdominal pain, nausea and vomiting. Endocrine: Negative for polydipsia and polyphagia. Genitourinary: Negative for difficulty urinating, frequency, vaginal bleeding and vaginal discharge. Musculoskeletal: Negative for gait problem. Skin: Negative for color change. Allergic/Immunologic: Negative for immunocompromised state. Neurological: Positive for weakness. Negative for dizziness and light-headedness. Hematological: Negative for adenopathy. Psychiatric/Behavioral: Negative for behavioral problems and confusion.        Except as noted above the remainder of the review of systems was reviewed and negative. PAST MEDICAL HISTORY   No past medical history on file. SURGICALHISTORY       Past Surgical History:   Procedure Laterality Date    FEMUR FRACTURE SURGERY Left 9/7/2021    OPEN REDUCTION INTERNAL FIXATION LEFT FEMUR FRACTURE performed by León Taylor MD at [address] Right 9/5/2019    HIP OPEN REDUCTION INTERNAL FIXATION performed by Hina Amaro MD at [address]       Discharge Medication List as of 9/13/2021 10:24 PM      CONTINUE these medications which have NOT CHANGED    Details   oxyCODONE (ROXICODONE) 5 MG immediate release tablet Take 1 tablet by mouth every 4 hours as needed for Pain for up to 3 days. , Disp-20 tablet, R-0Print      iron polysaccharides (NIFEREX) 150 MG capsule Take 1 capsule by mouth daily, Disp-30 capsule, R-0Print      senna (SENOKOT) 8.6 MG tablet Take 1 tablet by mouth 2 times daily for 10 days, Disp-20 tablet, R-0Print      ascorbic acid (VITAMIN C) 500 MG tablet Take 1 tablet by mouth 2 times daily for 30 doses, Disp-30 tablet, R-0Print      lisinopril (PRINIVIL;ZESTRIL) 10 MG tablet Take 10 mg by mouth dailyHistorical Med      bisacodyl (DULCOLAX) 10 MG suppository Place 10 mg rectally daily as needed for ConstipationHistorical Med      vitamin D3 (CHOLECALCIFEROL) 10 MCG (400 UNIT) TABS tablet Take 400 Units by mouth dailyHistorical Med      tolterodine (DETROL LA) 2 MG extended release capsule Take 2 mg by mouth dailyHistorical Med      aspirin 325 MG EC tablet Take 325 mg by mouth dailyHistorical Med      polyethylene glycol (GLYCOLAX) packet Take 12 g by mouth daily Historical Med      cyanocobalamin 500 MCG tablet Take 500 mcg by mouth dailyHistorical Med      labetalol (NORMODYNE) 100 MG tablet Take 200 mg by mouth 2 times daily Historical Med      atorvastatin (LIPITOR) 10 MG tablet Take 10 mg by mouth dailyHistorical Med      levothyroxine (SYNTHROID) 100 MCG tablet Take 100 mcg by mouth DailyHistorical Med             ALLERGIES     Ibuprofen, Naprosyn [naproxen], Plaquenil [hydroxychloroquine sulfate], and Tylenol [acetaminophen]    FAMILY HISTORY     No family history on file. SOCIAL HISTORY       Social History     Socioeconomic History    Marital status:      Spouse name: Not on file    Number of children: Not on file    Years of education: Not on file    Highest education level: Not on file   Occupational History    Not on file   Tobacco Use    Smoking status: Never Smoker    Smokeless tobacco: Never Used   Vaping Use    Vaping Use: Never used   Substance and Sexual Activity    Alcohol use: Not on file    Drug use: Not on file    Sexual activity: Not on file   Other Topics Concern    Not on file   Social History Narrative    Not on file     Social Determinants of Health     Financial Resource Strain:     Difficulty of Paying Living Expenses:    Food Insecurity:     Worried About Running Out of Food in the Last Year:     [address] in the Last Year:    Transportation Needs:     Lack of Transportation (Medical):      Lack of Transportation (Non-Medical):    Physical Activity:     Days of Exercise per Week:     Minutes of Exercise per Session:    Stress:     Feeling of Stress :    Social Connections:     Frequency of Communication with Friends and Family:     Frequency of Social Gatherings with Friends and Family:     Attends Yazidism Services:     Active Member of Clubs or Organizations:     Attends Club or Organization Meetings:     Marital Status:    Intimate Partner Violence:     Fear of Current or Ex-Partner:     Emotionally Abused:     Physically Abused:     Sexually Abused:        SCREENINGS      @FLOW([id number])@      PHYSICAL EXAM    (up to 7 for level 4, 8 or more for level 5)     ED Triage Vitals [09/13/21 1938]   BP Temp Temp Source Pulse Resp SpO2 Height Weight   (!) 146/68 99.4 °F (37.4 °C) Oral 92 -- -- 5' (1.524 m) 130 lb (59 kg)       Physical Exam  Constitutional:       Appearance: She is well-developed. Comments: Patient appears tired and fatigued but she is mentally clear and answers questions appropriately. She appears feeble   HENT:      Head: Normocephalic and atraumatic. Nose: No congestion. Eyes:      Conjunctiva/sclera: Conjunctivae normal.      Pupils: Pupils are equal, round, and reactive to light. Neck:      Vascular: No carotid bruit. Cardiovascular:      Rate and Rhythm: Normal rate. Heart sounds: No murmur heard. No gallop. Pulmonary:      Effort: Pulmonary effort is normal.      Breath sounds: No wheezing. Chest:      Chest wall: No tenderness. Abdominal:      General: Bowel sounds are normal.      Palpations: Abdomen is soft. Tenderness: There is no abdominal tenderness. Musculoskeletal:      Cervical back: Normal range of motion and neck supple. No rigidity or tenderness. Comments: Patient has post surgical bandage noted at her left lateral thigh. Her leg is warm to the high ankle. She has great popliteal pulses and no pulses noted distally. Her foot is bluish discoloration and externally rotated. Patient is unable to move it. She has some blistering of her left heel. It is cold to the touch. Skin:     General: Skin is warm and dry. Neurological:      Mental Status: She is oriented to person, place, and time. Motor: No weakness. Deep Tendon Reflexes: Reflexes are normal and symmetric. DIAGNOSTIC RESULTS     EKG: All EKG's are interpreted by the Emergency Department Physician who either signs or Co-signsthis chart in the absence of a cardiologist.    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 She has T wave inversion in 1,2,aVL + V4 through V6. This is consistent with findings on previous EKGs.     RADIOLOGY:   Non-plain filmimages such as CT, Ultrasound and MRI are read by the radiologist. Plain radiographic images are visualized and preliminarily interpreted by the emergency physician with the below findings:    Bedside Doppler reveals no pulses below the popliteal.  A formal left arterial ultrasound identifies no flow within the popliteal artery or calf arteries that is consistent with occlusion. CTA cancelled. Dr. Mai Garza was able to visualize a bone fragment from yesterday's contrasted abdominal CAT scan showing popliteal artery injury at that time. 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 COMMUNICATION:  Communicated by [facility] (Dr. Beatriz Mcgee) with: DENA Mcqueen on 9/13/2021 at 2037. ED BEDSIDE ULTRASOUND:   Performed by ED Physician - none    LABS:  Labs Reviewed   CBC - Abnormal; Notable for the following components:       Result Value    RBC 3.76 (*)     Hemoglobin 11.5 (*)     Hematocrit 34.0 (*)     RDW 16.8 (*)     All other components within normal limits   COMPREHENSIVE METABOLIC PANEL - Abnormal; Notable for the following components:    Anion Gap 7 (*)     Glucose 118 (*)     CREATININE 0.40 (*)     Calcium 8.0 (*)     Total Protein 4.8 (*)     Albumin 2.4 (*)     Total Bilirubin 1.2 (*)     ALT 53 (*)     AST 68 (*)     All other components within normal limits   PROTIME-INR       All other labs were within normal range or not returned as of this dictation. EMERGENCY DEPARTMENT COURSE and DIFFERENTIAL DIAGNOSIS/MDM:   Vitals:    Vitals:    09/13/21 2115 09/13/21 2130 09/13/21 2145 09/13/21 2200   BP:    (!) 148/62   Pulse: 94 94 89 92   Resp: 29 28 23 29   Temp:       TempSrc:       SpO2: 96%      Weight:       Height:           Patient's foot is obviously worrisome and based on her lack of pulses with bedside Doppler nonbolused weight-based heparin infusion is started.   A formal arterial Doppler was performed which is consistent with bedside exam.  Patient son Haider Irwin is contacted and is interested in vascular getting involved, as is the patient herself. Dr. Bereket Bui is contacted and made aware of the situation. He asks for a CTA abdomen pelvis with runoff stat and this is ordered, then canceled as he is able to glean information from yesterday's CAT scan that is helpful. This appears to be a trauma complication and transfer to [facility] is warranted. This transfer is initiated. Heparin is stopped before it is initiated. Son is again contacted and is on board with [facility] and surgery, this is initiated as soon as possible. [facility]    CRITICAL CARE TIME   Total Critical Care time was 35 minutes, excluding separately reportableprocedures. There was a high probability of clinicallysignificant/life threatening deterioration in the patient's condition which required my urgent intervention. Son is contacted twice as well as specialists and transfer facility. CONSULTS:  None    PROCEDURES:  Unless otherwise noted below, none     Procedures    FINAL IMPRESSION      1. Popliteal artery injury, left, initial encounter    2. Cold left foot          DISPOSITION/PLAN   DISPOSITION        PATIENT REFERRED TO:  No follow-up provider specified.     DISCHARGE MEDICATIONS:  Discharge Medication List as of 9/13/2021 10:24 PM             (Please note that portions of this note were completed with a voice recognition program.  Efforts were made to edit the dictations but occasionally words are mis-transcribed.)    Mike Manrique DO (electronically signed)  Attending Emergency Physician          Mike Manrique DO  09/13/21 8631       Mike Manrique DO  09/20/21 1010

## (undated) DEVICE — GLOVE SURG SZ 8 L12IN FNGR THK13MIL BRN LTX SYN POLYMER W

## (undated) DEVICE — SYRINGE IRRIG 60ML SFT PLIABLE BLB EZ TO GRP 1 HND USE W/

## (undated) DEVICE — DRESSING GZ W1XL8IN COT XRFRM N ADH OVERWRAP CURAD

## (undated) DEVICE — SUTURE ABSORBABLE BRAIDED 0 CT-1 8X27 IN UD VICRYL JJ41G

## (undated) DEVICE — COVER LT HNDL BLU PLAS

## (undated) DEVICE — SUTURE MCRYL SZ 4-0 L27IN ABSRB UD L19MM PS-2 1/2 CIR PRIM Y426H

## (undated) DEVICE — GLOVE ORANGE PI 7   MSG9070

## (undated) DEVICE — SPONGE GZ W4XL4IN COT 12 PLY TYP VII WVN C FLD DSGN

## (undated) DEVICE — Device: Brand: COVER, PERINEAL POST, 12 PK

## (undated) DEVICE — Device: Brand: PROTECTORS, LEG SPAR BALL JOINT, 12/PR

## (undated) DEVICE — ELECTROSURGICAL PENCIL BUTTON SWITCH E-Z CLEAN COATED BLADE ELECTRODE 10 FT (3 M) CORD HOLSTER: Brand: MEGADYNE

## (undated) DEVICE — INTENDED FOR TISSUE SEPARATION, AND OTHER PROCEDURES THAT REQUIRE A SHARP SURGICAL BLADE TO PUNCTURE OR CUT.: Brand: BARD-PARKER ® CARBON RIB-BACK BLADES

## (undated) DEVICE — PIN FIX L4.5MM S STL CERCLAGE THRD POS

## (undated) DEVICE — BANDAGE COMPR W6INXL5YD WHT BGE POLY COT M E WRP WV HK AND

## (undated) DEVICE — TOTAL TRAY, DB, 100% SILI FOLEY, 16FR 10: Brand: MEDLINE

## (undated) DEVICE — PAD,ABDOMINAL,8"X10",ST,LF: Brand: MEDLINE

## (undated) DEVICE — STAPLER SKIN H3.9MM WIRE DIA0.58MM CRWN 6.9MM 35 STPL FIX

## (undated) DEVICE — ALCOHOL RUBBING ISO 16OZ 70%

## (undated) DEVICE — CHLORAPREP 26ML ORANGE

## (undated) DEVICE — NEPTUNE E-SEP SMOKE EVACUATION PENCIL, COATED, 70MM BLADE, PUSH BUTTON SWITCH: Brand: NEPTUNE E-SEP

## (undated) DEVICE — C-ARM: Brand: UNBRANDED

## (undated) DEVICE — GOWN,SIRUS,POLYRNF,BRTHSLV,XLN/XL,20/CS: Brand: MEDLINE

## (undated) DEVICE — ELECTRODE PT RET AD L9FT HI MOIST COND ADH HYDRGEL CORDED

## (undated) DEVICE — GOWN,AURORA,NONREINFORCED,LARGE: Brand: MEDLINE

## (undated) DEVICE — 3M™ STERI-DRAPE™ U-DRAPE 1015: Brand: STERI-DRAPE™

## (undated) DEVICE — MARKER SURG SKIN GENTIAN VLT REG TIP W/ 6IN RUL

## (undated) DEVICE — PADDING CAST W6INXL4YD RAYON UNDERCAST SOF-ROL

## (undated) DEVICE — SUTURE VCRL SZ 0 L36IN ABSRB UD L36MM CT-1 1/2 CIR J946H

## (undated) DEVICE — LABEL MED MINI W/ MARKER

## (undated) DEVICE — SPONGE,LAP,18"X18",DLX,XR,ST,5/PK,40/PK: Brand: MEDLINE

## (undated) DEVICE — BIT DRL L145MM DIA4.5MM QUIK CPL W/O STP REUSE

## (undated) DEVICE — SHEET,DRAPE,53X77,STERILE: Brand: MEDLINE

## (undated) DEVICE — BANDAGE COMPR W4INXL5YD WHT BGE POLY COT M E WRP WV HK AND

## (undated) DEVICE — GLOVE ORANGE PI 8   MSG9080

## (undated) DEVICE — PACK ARTHRO III ST SIRUS

## (undated) DEVICE — BIT DRL L330MM DIA4.2MM CALIB 100MM 3 FLUT QUIK CPL

## (undated) DEVICE — GLOVE ORTHO 7 1/2   MSG9475

## (undated) DEVICE — COUNTER NDL 40 COUNT HLD 70 FOAM BLK ADH W/ MAG

## (undated) DEVICE — GLOVE ORANGE PI 7 1/2   MSG9075

## (undated) DEVICE — BIT DRL L145MM DIA3.2MM QUIK CPL W/O STP REUSE

## (undated) DEVICE — TUBING, SUCTION, 1/4" X 10', STRAIGHT: Brand: MEDLINE

## (undated) DEVICE — YANKAUER,SMOOTH HANDLE,HIGH CAPACITY: Brand: MEDLINE INDUSTRIES, INC.

## (undated) DEVICE — Device: Brand: BOOT LINER, DISPOSABLE

## (undated) DEVICE — GUIDEWIRE ORTH L400MM DIA3.2MM FOR TFN

## (undated) DEVICE — SUTURE VCRL + SZ 2-0 L36IN ABSRB UD L36MM CT-1 1/2 CIR VCP945H

## (undated) DEVICE — SUTURE VCRL SZ 2-0 L36IN ABSRB UD L36MM CT-1 1/2 CIR J945H

## (undated) DEVICE — APPLICATOR MEDICATED 26 CC SOLUTION HI LT ORNG CHLORAPREP

## (undated) DEVICE — SINGLE PORT MANIFOLD: Brand: NEPTUNE 2

## (undated) DEVICE — PACK,SET UP,DRAPE: Brand: MEDLINE

## (undated) DEVICE — DRESSING FOAM 4X6 DISP POSTOP MEPILEX BORD AG

## (undated) DEVICE — Device

## (undated) DEVICE — DRAPE,ISOLATION,INCISE,INVISISHIELD: Brand: MEDLINE

## (undated) DEVICE — BIT DRL L180MM DIA4.3MM QUIK CPL W/O STP REUSE